# Patient Record
Sex: FEMALE | Race: BLACK OR AFRICAN AMERICAN | HISPANIC OR LATINO | Employment: OTHER | URBAN - METROPOLITAN AREA
[De-identification: names, ages, dates, MRNs, and addresses within clinical notes are randomized per-mention and may not be internally consistent; named-entity substitution may affect disease eponyms.]

---

## 2018-02-05 ENCOUNTER — APPOINTMENT (OUTPATIENT)
Dept: PREADMISSION TESTING | Facility: HOSPITAL | Age: 73
End: 2018-02-05

## 2018-02-05 VITALS — HEIGHT: 64 IN | WEIGHT: 169.75 LBS | BODY MASS INDEX: 28.98 KG/M2

## 2018-02-05 LAB
ANION GAP SERPL CALCULATED.3IONS-SCNC: 7 MMOL/L (ref 3–11)
BUN BLD-MCNC: 17 MG/DL (ref 9–23)
BUN/CREAT SERPL: 18.9 (ref 7–25)
CALCIUM SPEC-SCNC: 10.1 MG/DL (ref 8.7–10.4)
CHLORIDE SERPL-SCNC: 107 MMOL/L (ref 99–109)
CO2 SERPL-SCNC: 28 MMOL/L (ref 20–31)
CREAT BLD-MCNC: 0.9 MG/DL (ref 0.6–1.3)
DEPRECATED RDW RBC AUTO: 44.5 FL (ref 37–54)
ERYTHROCYTE [DISTWIDTH] IN BLOOD BY AUTOMATED COUNT: 13.9 % (ref 11.3–14.5)
GFR SERPL CREATININE-BSD FRML MDRD: 75 ML/MIN/1.73
GLUCOSE BLD-MCNC: 90 MG/DL (ref 70–100)
HCT VFR BLD AUTO: 38.7 % (ref 34.5–44)
HGB BLD-MCNC: 12.5 G/DL (ref 11.5–15.5)
MCH RBC QN AUTO: 28.2 PG (ref 27–31)
MCHC RBC AUTO-ENTMCNC: 32.3 G/DL (ref 32–36)
MCV RBC AUTO: 87.2 FL (ref 80–99)
PLATELET # BLD AUTO: 202 10*3/MM3 (ref 150–450)
PMV BLD AUTO: 10.2 FL (ref 6–12)
POTASSIUM BLD-SCNC: 4.3 MMOL/L (ref 3.5–5.5)
RBC # BLD AUTO: 4.44 10*6/MM3 (ref 3.89–5.14)
SODIUM BLD-SCNC: 142 MMOL/L (ref 132–146)
WBC NRBC COR # BLD: 3.63 10*3/MM3 (ref 3.5–10.8)

## 2018-02-05 PROCEDURE — 93005 ELECTROCARDIOGRAM TRACING: CPT

## 2018-02-05 PROCEDURE — 80048 BASIC METABOLIC PNL TOTAL CA: CPT | Performed by: SURGERY

## 2018-02-05 PROCEDURE — 85027 COMPLETE CBC AUTOMATED: CPT | Performed by: SURGERY

## 2018-02-05 PROCEDURE — 93010 ELECTROCARDIOGRAM REPORT: CPT | Performed by: INTERNAL MEDICINE

## 2018-02-05 PROCEDURE — 36415 COLL VENOUS BLD VENIPUNCTURE: CPT

## 2018-02-05 RX ORDER — AMLODIPINE BESYLATE 10 MG/1
10 TABLET ORAL DAILY
COMMUNITY

## 2018-02-05 RX ORDER — PRAVASTATIN SODIUM 40 MG
40 TABLET ORAL NIGHTLY
COMMUNITY

## 2018-02-05 RX ORDER — ASPIRIN 81 MG/1
81 TABLET ORAL DAILY
COMMUNITY

## 2018-02-05 RX ORDER — METOPROLOL SUCCINATE 50 MG/1
200 TABLET, EXTENDED RELEASE ORAL DAILY
COMMUNITY

## 2018-02-07 ENCOUNTER — ANESTHESIA EVENT (OUTPATIENT)
Dept: PERIOP | Facility: HOSPITAL | Age: 73
End: 2018-02-07

## 2018-02-07 ENCOUNTER — HOSPITAL ENCOUNTER (OUTPATIENT)
Facility: HOSPITAL | Age: 73
Setting detail: HOSPITAL OUTPATIENT SURGERY
Discharge: HOME | End: 2018-02-07
Attending: SURGERY | Admitting: SURGERY

## 2018-02-07 ENCOUNTER — ANESTHESIA (OUTPATIENT)
Dept: PERIOP | Facility: HOSPITAL | Age: 73
End: 2018-02-07

## 2018-02-07 VITALS
DIASTOLIC BLOOD PRESSURE: 73 MMHG | SYSTOLIC BLOOD PRESSURE: 132 MMHG | OXYGEN SATURATION: 94 % | RESPIRATION RATE: 16 BRPM | HEART RATE: 75 BPM | TEMPERATURE: 98.2 F

## 2018-02-07 DIAGNOSIS — E21.3 HYPERPARATHYROIDISM (HCC): ICD-10-CM

## 2018-02-07 PROCEDURE — 88305 TISSUE EXAM BY PATHOLOGIST: CPT | Performed by: SURGERY

## 2018-02-07 PROCEDURE — 25010000002 NEOSTIGMINE 10 MG/10ML SOLUTION: Performed by: NURSE ANESTHETIST, CERTIFIED REGISTERED

## 2018-02-07 PROCEDURE — 88331 PATH CONSLTJ SURG 1 BLK 1SPC: CPT | Performed by: SURGERY

## 2018-02-07 PROCEDURE — 25010000002 PROPOFOL 10 MG/ML EMULSION: Performed by: NURSE ANESTHETIST, CERTIFIED REGISTERED

## 2018-02-07 PROCEDURE — 25010000002 ONDANSETRON PER 1 MG: Performed by: NURSE ANESTHETIST, CERTIFIED REGISTERED

## 2018-02-07 PROCEDURE — 25010000002 DEXAMETHASONE PER 1 MG: Performed by: NURSE ANESTHETIST, CERTIFIED REGISTERED

## 2018-02-07 PROCEDURE — 25010000002 PHENYLEPHRINE PER 1 ML: Performed by: NURSE ANESTHETIST, CERTIFIED REGISTERED

## 2018-02-07 PROCEDURE — 25010000002 FENTANYL CITRATE (PF) 100 MCG/2ML SOLUTION: Performed by: NURSE ANESTHETIST, CERTIFIED REGISTERED

## 2018-02-07 RX ORDER — FAMOTIDINE 10 MG/ML
20 INJECTION, SOLUTION INTRAVENOUS ONCE
Status: DISCONTINUED | OUTPATIENT
Start: 2018-02-07 | End: 2018-02-07

## 2018-02-07 RX ORDER — PROPOFOL 10 MG/ML
VIAL (ML) INTRAVENOUS AS NEEDED
Status: DISCONTINUED | OUTPATIENT
Start: 2018-02-07 | End: 2018-02-07 | Stop reason: SURG

## 2018-02-07 RX ORDER — SODIUM CHLORIDE, SODIUM LACTATE, POTASSIUM CHLORIDE, CALCIUM CHLORIDE 600; 310; 30; 20 MG/100ML; MG/100ML; MG/100ML; MG/100ML
9 INJECTION, SOLUTION INTRAVENOUS CONTINUOUS
Status: DISCONTINUED | OUTPATIENT
Start: 2018-02-07 | End: 2018-02-07 | Stop reason: HOSPADM

## 2018-02-07 RX ORDER — GLYCOPYRROLATE 0.2 MG/ML
INJECTION INTRAMUSCULAR; INTRAVENOUS AS NEEDED
Status: DISCONTINUED | OUTPATIENT
Start: 2018-02-07 | End: 2018-02-07 | Stop reason: SURG

## 2018-02-07 RX ORDER — NEOSTIGMINE METHYLSULFATE 1 MG/ML
INJECTION, SOLUTION INTRAVENOUS AS NEEDED
Status: DISCONTINUED | OUTPATIENT
Start: 2018-02-07 | End: 2018-02-07 | Stop reason: SURG

## 2018-02-07 RX ORDER — DEXAMETHASONE SODIUM PHOSPHATE 4 MG/ML
INJECTION, SOLUTION INTRA-ARTICULAR; INTRALESIONAL; INTRAMUSCULAR; INTRAVENOUS; SOFT TISSUE AS NEEDED
Status: DISCONTINUED | OUTPATIENT
Start: 2018-02-07 | End: 2018-02-07 | Stop reason: SURG

## 2018-02-07 RX ORDER — ONDANSETRON 2 MG/ML
INJECTION INTRAMUSCULAR; INTRAVENOUS AS NEEDED
Status: DISCONTINUED | OUTPATIENT
Start: 2018-02-07 | End: 2018-02-07 | Stop reason: SURG

## 2018-02-07 RX ORDER — HYDROMORPHONE HYDROCHLORIDE 1 MG/ML
0.5 INJECTION, SOLUTION INTRAMUSCULAR; INTRAVENOUS; SUBCUTANEOUS
Status: DISCONTINUED | OUTPATIENT
Start: 2018-02-07 | End: 2018-02-07 | Stop reason: HOSPADM

## 2018-02-07 RX ORDER — LIDOCAINE HYDROCHLORIDE 10 MG/ML
INJECTION, SOLUTION EPIDURAL; INFILTRATION; INTRACAUDAL; PERINEURAL AS NEEDED
Status: DISCONTINUED | OUTPATIENT
Start: 2018-02-07 | End: 2018-02-07 | Stop reason: SURG

## 2018-02-07 RX ORDER — ONDANSETRON 2 MG/ML
4 INJECTION INTRAMUSCULAR; INTRAVENOUS ONCE AS NEEDED
Status: DISCONTINUED | OUTPATIENT
Start: 2018-02-07 | End: 2018-02-07 | Stop reason: HOSPADM

## 2018-02-07 RX ORDER — SODIUM CHLORIDE 0.9 % (FLUSH) 0.9 %
1-10 SYRINGE (ML) INJECTION AS NEEDED
Status: DISCONTINUED | OUTPATIENT
Start: 2018-02-07 | End: 2018-02-07 | Stop reason: HOSPADM

## 2018-02-07 RX ORDER — FENTANYL CITRATE 50 UG/ML
50 INJECTION, SOLUTION INTRAMUSCULAR; INTRAVENOUS
Status: DISCONTINUED | OUTPATIENT
Start: 2018-02-07 | End: 2018-02-07 | Stop reason: HOSPADM

## 2018-02-07 RX ORDER — FAMOTIDINE 20 MG/1
20 TABLET, FILM COATED ORAL ONCE
Status: DISCONTINUED | OUTPATIENT
Start: 2018-02-07 | End: 2018-02-07

## 2018-02-07 RX ORDER — LABETALOL HYDROCHLORIDE 5 MG/ML
5 INJECTION, SOLUTION INTRAVENOUS
Status: DISCONTINUED | OUTPATIENT
Start: 2018-02-07 | End: 2018-02-07 | Stop reason: HOSPADM

## 2018-02-07 RX ORDER — SODIUM CHLORIDE 0.9 % (FLUSH) 0.9 %
1-10 SYRINGE (ML) INJECTION AS NEEDED
Status: DISCONTINUED | OUTPATIENT
Start: 2018-02-07 | End: 2018-02-07

## 2018-02-07 RX ORDER — NAPROXEN SODIUM 220 MG
220 TABLET ORAL 2 TIMES DAILY PRN
COMMUNITY

## 2018-02-07 RX ORDER — LIDOCAINE HYDROCHLORIDE 10 MG/ML
0.5 INJECTION, SOLUTION EPIDURAL; INFILTRATION; INTRACAUDAL; PERINEURAL ONCE AS NEEDED
Status: COMPLETED | OUTPATIENT
Start: 2018-02-07 | End: 2018-02-07

## 2018-02-07 RX ORDER — LIDOCAINE HYDROCHLORIDE 10 MG/ML
0.5 INJECTION, SOLUTION EPIDURAL; INFILTRATION; INTRACAUDAL; PERINEURAL ONCE AS NEEDED
Status: DISCONTINUED | OUTPATIENT
Start: 2018-02-07 | End: 2018-02-07

## 2018-02-07 RX ORDER — FAMOTIDINE 20 MG/1
20 TABLET, FILM COATED ORAL ONCE
Status: COMPLETED | OUTPATIENT
Start: 2018-02-07 | End: 2018-02-07

## 2018-02-07 RX ORDER — FENTANYL CITRATE 50 UG/ML
INJECTION, SOLUTION INTRAMUSCULAR; INTRAVENOUS AS NEEDED
Status: DISCONTINUED | OUTPATIENT
Start: 2018-02-07 | End: 2018-02-07 | Stop reason: SURG

## 2018-02-07 RX ORDER — SODIUM CHLORIDE, SODIUM LACTATE, POTASSIUM CHLORIDE, CALCIUM CHLORIDE 600; 310; 30; 20 MG/100ML; MG/100ML; MG/100ML; MG/100ML
9 INJECTION, SOLUTION INTRAVENOUS CONTINUOUS
Status: DISCONTINUED | OUTPATIENT
Start: 2018-02-07 | End: 2018-02-07

## 2018-02-07 RX ORDER — ATRACURIUM BESYLATE 10 MG/ML
INJECTION, SOLUTION INTRAVENOUS AS NEEDED
Status: DISCONTINUED | OUTPATIENT
Start: 2018-02-07 | End: 2018-02-07 | Stop reason: SURG

## 2018-02-07 RX ADMIN — PROPOFOL 100 MG: 10 INJECTION, EMULSION INTRAVENOUS at 07:52

## 2018-02-07 RX ADMIN — LIDOCAINE HYDROCHLORIDE 0.5 ML: 10 INJECTION, SOLUTION EPIDURAL; INFILTRATION; INTRACAUDAL; PERINEURAL at 06:58

## 2018-02-07 RX ADMIN — FENTANYL CITRATE 100 MCG: 50 INJECTION, SOLUTION INTRAMUSCULAR; INTRAVENOUS at 07:52

## 2018-02-07 RX ADMIN — FENTANYL CITRATE 150 MCG: 50 INJECTION, SOLUTION INTRAMUSCULAR; INTRAVENOUS at 08:50

## 2018-02-07 RX ADMIN — GLYCOPYRROLATE 0.1 MG: 0.2 INJECTION, SOLUTION INTRAMUSCULAR; INTRAVENOUS at 08:43

## 2018-02-07 RX ADMIN — NEOSTIGMINE METHYLSULFATE 2 MG: 1 INJECTION, SOLUTION INTRAVENOUS at 08:43

## 2018-02-07 RX ADMIN — DEXAMETHASONE SODIUM PHOSPHATE 8 MG: 4 INJECTION, SOLUTION INTRAMUSCULAR; INTRAVENOUS at 07:52

## 2018-02-07 RX ADMIN — SODIUM CHLORIDE, POTASSIUM CHLORIDE, SODIUM LACTATE AND CALCIUM CHLORIDE: 600; 310; 30; 20 INJECTION, SOLUTION INTRAVENOUS at 08:25

## 2018-02-07 RX ADMIN — PHENYLEPHRINE HYDROCHLORIDE 100 MCG: 10 INJECTION INTRAVENOUS at 08:02

## 2018-02-07 RX ADMIN — PHENYLEPHRINE HYDROCHLORIDE 100 MCG: 10 INJECTION INTRAVENOUS at 08:00

## 2018-02-07 RX ADMIN — ONDANSETRON 4 MG: 2 INJECTION INTRAMUSCULAR; INTRAVENOUS at 08:25

## 2018-02-07 RX ADMIN — ATRACURIUM BESYLATE 50 MG: 10 INJECTION, SOLUTION INTRAVENOUS at 07:52

## 2018-02-07 RX ADMIN — SODIUM CHLORIDE, POTASSIUM CHLORIDE, SODIUM LACTATE AND CALCIUM CHLORIDE 9 ML/HR: 600; 310; 30; 20 INJECTION, SOLUTION INTRAVENOUS at 06:57

## 2018-02-07 RX ADMIN — PHENYLEPHRINE HYDROCHLORIDE 100 MCG: 10 INJECTION INTRAVENOUS at 07:55

## 2018-02-07 RX ADMIN — FAMOTIDINE 20 MG: 20 TABLET, FILM COATED ORAL at 06:58

## 2018-02-07 RX ADMIN — LIDOCAINE HYDROCHLORIDE 60 MG: 10 INJECTION, SOLUTION EPIDURAL; INFILTRATION; INTRACAUDAL; PERINEURAL at 07:52

## 2018-02-07 RX ADMIN — PHENYLEPHRINE HYDROCHLORIDE 100 MCG: 10 INJECTION INTRAVENOUS at 08:22

## 2018-02-07 NOTE — PLAN OF CARE
Problem: Perioperative Period (Adult)  Goal: Signs and Symptoms of Listed Potential Problems Will be Absent or Manageable (Perioperative Period)  Outcome: Outcome(s) achieved Date Met: 02/07/18

## 2018-02-07 NOTE — ANESTHESIA PREPROCEDURE EVALUATION
Anesthesia Evaluation     Patient summary reviewed and Nursing notes reviewed   NPO Solid Status: > 8 hours  NPO Liquid Status: > 8 hours           Airway   Mallampati: III  TM distance: >3 FB  Neck ROM: full  possible difficult intubation  Dental    (+) implants        Pulmonary    (-) COPD, asthma, shortness of breath, recent URI, not a smoker  Cardiovascular     ECG reviewed    (+) hypertension, hyperlipidemia  (-) past MI, CAD, dysrhythmias, angina, cardiac stents, CABG    ROS comment: EKG Normal SR    Neuro/Psych  (-) seizures, TIA, CVA  GI/Hepatic/Renal/Endo    (-) liver disease, no renal disease, diabetes, hypothyroidism    Musculoskeletal     Abdominal    Substance History      OB/GYN          Other      history of cancer (parathyroid adenoma )        Phys Exam Other: Loose bridge work unremoveable               Anesthesia Plan    ASA 2     general   (Glidescope Intubation care with upper bridge   Prop INF,  Opioid sparing . Anti PONV   )  intravenous induction   Anesthetic plan and risks discussed with patient.    Plan discussed with CRNA.

## 2018-02-07 NOTE — ANESTHESIA POSTPROCEDURE EVALUATION
Patient: Amy Rutherford    Procedure Summary     Date Anesthesia Start Anesthesia Stop Room / Location    02/07/18 0747 0900  [facility]       Procedure Diagnosis Surgeon Provider    EXCISION PARATHYROID ADENOMA (N/A Neck) No diagnosis on file. MD Erickson Wooten MD          Anesthesia Type: general  Last vitals  BP   139/73 (02/07/18 0900)   Temp   98.7 °F (37.1 °C) (02/07/18 0900)   Pulse   83 (02/07/18 0900)   Resp   16 (02/07/18 0900)     SpO2   94 % (02/07/18 0900)     Post Anesthesia Care and Evaluation    Patient location during evaluation: PACU  Patient participation: complete - patient participated  Level of consciousness: awake and alert  Pain score: 0  Pain management: adequate  Airway patency: patent  Anesthetic complications: No anesthetic complications  PONV Status: none  Cardiovascular status: hemodynamically stable and acceptable  Respiratory status: acceptable and nonlabored ventilation  Hydration status: acceptable

## 2018-02-07 NOTE — ANESTHESIA PROCEDURE NOTES
Airway  Urgency: elective    Airway not difficult    General Information and Staff    Patient location during procedure: OR    Indications and Patient Condition  Indications for airway management: airway protection    Preoxygenated: yes  MILS not maintained throughout  Mask difficulty assessment: 1 - vent by mask    Final Airway Details  Final airway type: endotracheal airway      Successful airway: ETT  Cuffed: yes   Successful intubation technique: direct laryngoscopy  Endotracheal tube insertion site: oral  Blade: Britta  Blade size: #3  ETT size: 6.5 mm  Cormack-Lehane Classification: grade I - full view of glottis  Placement verified by: chest auscultation and capnometry   Number of attempts at approach: 1    Additional Comments  Negative epigastric sounds, Breath sound equal bilaterally with symmetric chest rise and fall

## 2018-02-07 NOTE — H&P
Pre-Op H&P  Amy Rutherford  [id number]  1945 February 7, 2018    Chief complaint: primary hyperparathryroidism    HPI:  Patient is a 72 y.o.female presents with bone pain and hypercalcemia     Review of Systems:  General ROS: negative for chills, fever or skin lesions;  No changes since last office visit  Cardiovascular ROS: no chest pain or dyspnea on exertion  Respiratory ROS: no cough, shortness of breath, or wheezing    Allergies: No Known Allergies    Home Meds:    No current facility-administered medications on file prior to encounter.      No current outpatient prescriptions on file prior to encounter.       PMH:   Past Medical History:   Diagnosis Date   • Dental bridge present    • Hyperlipidemia    • Hypertension    • Parathyroid adenoma    • Wears eyeglasses      PSH:    Past Surgical History:   Procedure Laterality Date   • COLONOSCOPY      2 years ago   • MOUTH SURGERY         Immunization History:  Influenza: yes  Pneumococcal: yes  Tetanus: unknown    Social History:   Tobacco:   History   Smoking Status   • Former Smoker   Smokeless Tobacco   • Never Used      Alcohol:     History   Alcohol Use   • Yes     Comment: occasionally       Vitals:           There were no vitals taken for this visit.    Physical Exam:  General Appearance:    Alert, cooperative, no distress, appears stated age   Head:    Normocephalic, without obvious abnormality, atraumatic   Lungs:     Clear to auscultation bilaterally, respirations unlabored    Heart:   Regular rate and rhythm, S1 and S2 normal, no murmur, rub    or gallop    Abdomen:    Soft, non-tender.  +bowel sounds   Breast Exam:    deferred   Genitalia:    deferred   Extremities:   Extremities normal, atraumatic, no cyanosis or edema   Skin:   Skin color, texture, turgor normal, no rashes or lesions   Neurologic:   Grossly intact   Results Review  I reviewed the patient's new clinical results.    Cancer Staging (if applicable)  Cancer Patient: __ yes __no  __unknown; If yes, clinical stage T:__ N:__M:__, stage group or __N/A    Impression: very pleasant 72 year old female who presents with primary parathyroid adenoma    Plan: excision parathyroid adenoma    MANUEL Uriarte   2/7/2018   6:59 AM

## 2018-02-07 NOTE — OP NOTE
PARATHYROIDECTOMY  Procedure Note    Amy Rutherford  Date of Surgery:  2/7/2018    Pre-op Diagnosis:   Primary hyperparathyroidism    Post-op Diagnosis:     Same, secondary to left inferior parathyroid adenoma    Operative Procedure:   Excision of parathyroid adenoma    Indications:  Mrs. Rutherford is a 72-year-old female who presented with increasing hypercalcemia.  Her parathyroid hormone level was also elevated.  A preoperative ultrasound was consistent with a parathyroid adenoma adjacent to the inferior pole of the left thyroid lobe.    Operative note:  On 2/7/18, the patient was taken to the operating room and placed supine on the operating table.  Following adequate induction of general endotracheal anesthesia, a roll was placed underneath the shoulders and the head and neck carefully extended.  The neck and chest were prepped and draped in the standard fashion.  A transverse incision was made in the lower third of the neck.  The subcutaneous tissue was divided with the cautery.  A platysma flap was raised to the level of the thyroid cartilage superiorly and the clavicular heads inferiorly.  The strap muscles were split in the midline.  All dissection was done under 3-1/2 power magnification.  The strap muscles overlying the left thyroid lobe were bluntly dissected revealing a small normal size thyroid lobe.  The middle thyroid vein was divided between clips and small venous tributaries were divided on the substance of the gland with the bipolar cautery.  The superior parathyroid gland was readily identified adjacent to the inferior thyroid artery.  This did not appear to be abnormal.  A small piece of this was sent for biopsy and this was shown to be a normal cellular parathyroid gland.  The gland was further mobilized and the recurrent nerve identified.  Further dissection towards the mediastinum revealed a nodule that was consistent with a parathyroid adenoma.  This actually extended into the thymic had.   This was elevated into the wound and excised in its entirety.  This was sent for frozen section examination that showed this to be a hypercellular parathyroid adenoma.  No other abnormality was noted in the left neck.  It was my decision at this point to not explore the other side.  The wound was irrigated.  Hemostasis was excellent.  The left superior gland was clearly viable.  The recurrent laryngeal nerve was traced from its origin on the neck up to its insertion into the cricothyroid membrane and appeared to be uninjured.  Gelfoam powder was then sprayed into the wound and the strap muscles reapproximated in the midline with running 3-0 Vicryl.  The subcutaneous tissue was reapproximated with running 3-0 Vicryl, and the skin closed with a running 4-0 Vicryl subcuticular suture.  A sterile dressing was applied in the operating room.  The patient tolerated the procedure well and there were no complications.  Estimated blood loss is less than 10 mL's.  Both preoperative and postoperative sponge and needle counts were correct.  The patient was taken to the recovery room in satisfactory condition.    Rick Corrales MD     Date: 2/7/2018  Time: 9:11 AM

## 2018-02-07 NOTE — BRIEF OP NOTE
PARATHYROIDECTOMY  Progress Note    Amy Rutherford  2/7/2018    Pre-op Diagnosis:   Primary hyperparathyrroidism       Post-Op Diagnosis Codes:   same    Procedure/CPT® Codes:      Procedure(s):  EXCISION PARATHYROID ADENOMA    Surgeon(s):  Rick COOPER MD    Anesthesia: General    Staff:   Circulator: Gertrude Sawyer RN  Scrub Person: Debi Fowler  Assistant: Rain Sepulveda PA-C    Estimated Blood Loss: minimal    Urine Voided: * No values recorded between 2/7/2018  7:49 AM and 2/7/2018  8:49 AM *    Specimens:                  ID Type Source Tests Collected by Time Destination   A : BX LEFT SUPERIOR PARATHYROID Tissue Parathyroid Gland TISSUE EXAM Rick COOPER MD 2/7/2018 0823    B : LEFT INFERIOR PARATHYROID Tissue Parathyroid Gland TISSUE EXAM Rick COOPER MD 2/7/2018 0834          Drains:           Findings: normocellular left superior gland;adenomatous left inferior gland    Complications: none      Rick Corrales MD     Date: 2/7/2018  Time: 8:49 AM

## 2018-02-08 LAB
CYTO UR: NORMAL
LAB AP CASE REPORT: NORMAL
LAB AP CLINICAL INFORMATION: NORMAL
Lab: NORMAL
Lab: NORMAL
PATH REPORT.FINAL DX SPEC: NORMAL
PATH REPORT.GROSS SPEC: NORMAL

## 2018-02-20 PROCEDURE — 36415 COLL VENOUS BLD VENIPUNCTURE: CPT | Performed by: SURGERY

## 2018-02-21 ENCOUNTER — LAB REQUISITION (OUTPATIENT)
Dept: LAB | Facility: HOSPITAL | Age: 73
End: 2018-02-21

## 2018-02-21 DIAGNOSIS — Z00.00 ROUTINE GENERAL MEDICAL EXAMINATION AT A HEALTH CARE FACILITY: ICD-10-CM

## 2019-09-16 ENCOUNTER — LAB REQUISITION (OUTPATIENT)
Dept: LAB | Facility: HOSPITAL | Age: 74
End: 2019-09-16

## 2019-09-16 DIAGNOSIS — E21.3 HYPERPARATHYROIDISM (HCC): ICD-10-CM

## 2019-09-16 DIAGNOSIS — E83.51 HYPOCALCEMIA: ICD-10-CM

## 2019-09-16 DIAGNOSIS — I10 ESSENTIAL (PRIMARY) HYPERTENSION: ICD-10-CM

## 2019-09-16 PROCEDURE — 36415 COLL VENOUS BLD VENIPUNCTURE: CPT | Performed by: INTERNAL MEDICINE

## 2019-09-18 ENCOUNTER — LAB REQUISITION (OUTPATIENT)
Dept: LAB | Facility: HOSPITAL | Age: 74
End: 2019-09-18

## 2019-09-18 DIAGNOSIS — Z00.00 ROUTINE GENERAL MEDICAL EXAMINATION AT A HEALTH CARE FACILITY: ICD-10-CM

## 2019-09-18 PROCEDURE — 84156 ASSAY OF PROTEIN URINE: CPT | Performed by: INTERNAL MEDICINE

## 2019-09-18 PROCEDURE — 84166 PROTEIN E-PHORESIS/URINE/CSF: CPT | Performed by: INTERNAL MEDICINE

## 2019-09-23 LAB
ALBUMIN 24H MFR UR ELPH: 57.1 %
ALPHA1 GLOB 24H MFR UR ELPH: 1.2 %
ALPHA2 GLOB 24H MFR UR ELPH: 7 %
B-GLOBULIN MFR UR ELPH: 32.2 %
GAMMA GLOB 24H MFR UR ELPH: 2.4 %
Lab: ABNORMAL
M PROTEIN MFR UR ELPH: ABNORMAL %
PROT 24H UR-MRATE: 235 MG/24 HR (ref 30–150)
PROT UR-MCNC: 8.7 MG/DL

## 2021-02-26 ENCOUNTER — OFFICE VISIT (OUTPATIENT)
Dept: GASTROENTEROLOGY | Facility: CLINIC | Age: 76
End: 2021-02-26

## 2021-02-26 VITALS
DIASTOLIC BLOOD PRESSURE: 88 MMHG | BODY MASS INDEX: 26.29 KG/M2 | SYSTOLIC BLOOD PRESSURE: 140 MMHG | TEMPERATURE: 97.8 F | OXYGEN SATURATION: 99 % | HEIGHT: 65 IN | HEART RATE: 77 BPM | WEIGHT: 157.8 LBS

## 2021-02-26 DIAGNOSIS — R12 HEARTBURN: Primary | ICD-10-CM

## 2021-02-26 DIAGNOSIS — K76.89 LIVER CYST: ICD-10-CM

## 2021-02-26 DIAGNOSIS — R11.0 NAUSEA: ICD-10-CM

## 2021-02-26 PROCEDURE — 99204 OFFICE O/P NEW MOD 45 MIN: CPT | Performed by: INTERNAL MEDICINE

## 2021-02-26 RX ORDER — ROSUVASTATIN CALCIUM 20 MG/1
20 TABLET, COATED ORAL NIGHTLY
COMMUNITY
Start: 2021-01-13

## 2021-02-26 RX ORDER — PROMETHAZINE HYDROCHLORIDE 12.5 MG/1
TABLET ORAL
COMMUNITY
Start: 2021-02-22

## 2021-02-26 RX ORDER — LOSARTAN POTASSIUM 25 MG/1
25 TABLET ORAL DAILY
COMMUNITY
Start: 2021-02-02

## 2021-02-26 NOTE — PROGRESS NOTES
Patient Name: Amy Rutherford  YOB: 1945   Medical Record number: [id number]     PCP: Ange Gomez PA    Chief Complaint   Patient presents with   • Heartburn       History of Present Illness:   HPI  I appreciate the consult for heartburn and nausea.  Mrs. Rutherford presents to the office for evaluation of heartburn and nausea.  The history dates back a couple of years when she was experiencing symptoms.  The patient had gallbladder surgery but did not have resolution of the nausea.  She was evaluated at the [facility] in late 2019.  An MRI was performed at that time.  She is unaware of the exact results.  Mrs. Rutherford states that she did some reading and stopped taking amlodipine which has significantly improved the issue with nausea.  The patient admits that nausea would usually occur in the morning after waking up.  She would generally eat lunch and dinner.  The patient admits to occasionally consuming snacks late in the evening.  There is no history of vomiting.  Mrs. Rutherford denies any difficult or painful swallowing.  She had an upper endoscopy performed by Dr. He last year.  Mrs. Rutherford believes that the Protonix medication was causing her to have some abdominal pain so she stopped taking the medication.  However heartburn has returned a couple days a week.  Mrs. Rutherford denies any abdominal pain.  There is no current issue with weight loss.  She denies any change in bowel habits or signs of bleeding.  There is no family history of gastrointestinal cancer.  Her son does have a history of chronic pancreatitis.  The patient not smoke cigarettes or drink alcohol.  Past Medical History:   Diagnosis Date   • Dental bridge present    • Hyperlipidemia    • Hypertension    • Parathyroid adenoma    • Wears eyeglasses        Past Surgical History:   Procedure Laterality Date   • COLONOSCOPY      2 years ago   • MOUTH SURGERY     • IA EXPLORE PARATHYROID GLANDS N/A 2/7/2018     Procedure: EXCISION PARATHYROID ADENOMA;  Surgeon: Rick COOPER MD;  [facility]: [facility];  Service: General   • TONSILLECTOMY           Current Outpatient Medications:   •  aspirin 81 MG EC tablet, Take 81 mg by mouth Daily., Disp: , Rfl:   •  losartan (COZAAR) 25 MG tablet, Take 25 mg by mouth Daily., Disp: , Rfl:   •  metoprolol succinate XL (TOPROL-XL) 50 MG 24 hr tablet, Take 100 mg by mouth 2 (two) times a day., Disp: , Rfl:   •  naproxen sodium (ALEVE) 220 MG tablet, Take 220 mg by mouth 2 (Two) Times a Day As Needed for Mild Pain ., Disp: , Rfl:   •  promethazine (PHENERGAN) 12.5 MG tablet, TAKE 1 TABLET BY MOUTH TWICE DAILY AS NEEDED FOR NAUSEA, Disp: , Rfl:   •  rosuvastatin (CRESTOR) 20 MG tablet, Take 20 mg by mouth Every Night., Disp: , Rfl:   •  amLODIPine (NORVASC) 10 MG tablet, Take 10 mg by mouth Daily., Disp: , Rfl:   •  oxyCODONE-acetaminophen (PERCOCET) 5-325 MG per tablet, Take 1-2 tablets by mouth Every 6 (Six) Hours As Needed for pain., Disp: 20 tablet, Rfl: 0  •  pravastatin (PRAVACHOL) 40 MG tablet, Take 40 mg by mouth Every Night., Disp: , Rfl:     Allergies   Allergen Reactions   • Reglan [Metoclopramide] Anxiety       Family History   Family history unknown: Yes       Social History     Socioeconomic History   • Marital status:      Spouse name: Not on file   • Number of children: Not on file   • Years of education: Not on file   • Highest education level: Not on file   Tobacco Use   • Smoking status: Former Smoker   • Smokeless tobacco: Never Used   Substance and Sexual Activity   • Alcohol use: Yes     Comment: occasionally   • Drug use: No   • Sexual activity: Yes     Partners: Male     Comment:         Review of Systems   Constitutional: Negative for activity change, appetite change, fatigue, fever and unexpected weight change.   HENT: Negative for dental problem, hearing loss, mouth sores, postnasal drip, sneezing, trouble swallowing and voice change.    Eyes: Negative  for pain, redness, itching and visual disturbance.   Respiratory: Negative for cough, choking, chest tightness, shortness of breath and wheezing.    Cardiovascular: Negative for chest pain, palpitations and leg swelling.   Gastrointestinal: Positive for nausea. Negative for abdominal distention, abdominal pain, anal bleeding, blood in stool, constipation, diarrhea, rectal pain and vomiting.        Heartburn   Endocrine: Negative for cold intolerance, heat intolerance, polydipsia, polyphagia and polyuria.   Genitourinary: Negative.  Negative for dysuria, enuresis, flank pain, hematuria and urgency.   Musculoskeletal: Negative for arthralgias, back pain, gait problem, joint swelling and myalgias.   Skin: Negative for color change, pallor and rash.   Allergic/Immunologic: Negative for environmental allergies, food allergies and immunocompromised state.   Neurological: Negative for dizziness, tremors, seizures, facial asymmetry, speech difficulty, numbness and headaches.   Hematological: Negative for adenopathy.   Psychiatric/Behavioral: Negative for behavioral problems, confusion, dysphoric mood, hallucinations and self-injury.       Vitals:    02/26/21 0812   BP: 140/88   Pulse: 77   Temp: 97.8 °F (36.6 °C)   SpO2: 99%       Physical Exam  Vitals signs reviewed.   Constitutional:       Appearance: Normal appearance.   HENT:      Head: Normocephalic and atraumatic.      Nose: Nose normal.      Mouth/Throat:      Mouth: Mucous membranes are moist.      Pharynx: Oropharynx is clear.   Eyes:      General: No scleral icterus.     Extraocular Movements: Extraocular movements intact.   Neck:      Musculoskeletal: Normal range of motion. No neck rigidity.   Cardiovascular:      Rate and Rhythm: Normal rate and regular rhythm.      Heart sounds: No murmur. No gallop.    Pulmonary:      Effort: Pulmonary effort is normal.      Breath sounds: No wheezing or rales.   Abdominal:      General: Abdomen is flat. Bowel sounds are  normal.      Palpations: Abdomen is soft.      Tenderness: There is no abdominal tenderness. There is no guarding.   Musculoskeletal: Normal range of motion.         General: No swelling.   Skin:     General: Skin is warm and dry.      Coloration: Skin is not jaundiced.   Neurological:      General: No focal deficit present.      Mental Status: She is alert and oriented to person, place, and time.   Psychiatric:         Mood and Affect: Mood normal.         Thought Content: Thought content normal.         Judgment: Judgment normal.         Diagnoses and all orders for this visit:    1. Heartburn (Primary)  -     NM Gastric Emptying; Future    2. Nausea  -     NM Gastric Emptying; Future    3. Liver cyst  -     MRI abdomen w wo contrast mrcp; Future    The patient does admit to some recent heartburn.  This has been more of an issue since stopping the Protonix which she thought was causing  abdominal pain.  She will likely need to be on medication for GERD.  The history of nausea would suggest the possibility of gastroparesis.  The patient believes a gastric empty study was done in the past but there were no records available.  I reviewed MRI performed at [facility] in 2019.  There is a question of a cyst in the liver about 5 cm.  There were also smaller tiny lesions considered to be cysts present.  The most recent liver panel tests were within normal limits.  The finding on the previous MRI should be followed up given the current complaints.      Plan: Will schedule 4 hour gastric emptying study.           Will schedule MRI of the abdomen to assess and follow-up presumed liver cyst.           Will give samples of Dexilant 60 mg capsule 1 by mouth daily.           Will give the patient information regarding GERD and lifestyle modification.           Will follow-up in 3 months.

## 2021-02-26 NOTE — PATIENT INSTRUCTIONS
Gastroesophageal Reflux Disease, Adult  Gastroesophageal reflux (RASHIDA) happens when acid from the stomach flows up into the tube that connects the mouth and the stomach (esophagus). Normally, food travels down the esophagus and stays in the stomach to be digested. With RASHIDA, food and stomach acid sometimes move back up into the esophagus. You may have a disease called gastroesophageal reflux disease (GERD) if the reflux:  · Happens often.  · Causes frequent or very bad symptoms.  · Causes problems such as damage to the esophagus.  When this happens, the esophagus becomes sore and swollen (inflamed). Over time, GERD can make small holes (ulcers) in the lining of the esophagus.  What are the causes?  This condition is caused by a problem with the muscle between the esophagus and the stomach. When this muscle is weak or not normal, it does not close properly to keep food and acid from coming back up from the stomach. The muscle can be weak because of:  · Tobacco use.  · Pregnancy.  · Having a certain type of hernia (hiatal hernia).  · Alcohol use.  · Certain foods and drinks, such as coffee, chocolate, onions, and peppermint.  What increases the risk?  You are more likely to develop this condition if you:  · Are overweight.  · Have a disease that affects your connective tissue.  · Use NSAID medicines.  What are the signs or symptoms?  Symptoms of this condition include:  · Heartburn.  · Difficult or painful swallowing.  · The feeling of having a lump in the throat.  · A bitter taste in the mouth.  · Bad breath.  · Having a lot of saliva.  · Having an upset or bloated stomach.  · Belching.  · Chest pain. Different conditions can cause chest pain. Make sure you see your doctor if you have chest pain.  · Shortness of breath or noisy breathing (wheezing).  · Ongoing (chronic) cough or a cough at night.  · Wearing away of the surface of teeth (tooth enamel).  · Weight loss.  How is this treated?  Treatment will depend on how  bad your symptoms are. Your doctor may suggest:  · Changes to your diet.  · Medicine.  · Surgery.  Follow these instructions at home:  Eating and drinking    · Follow a diet as told by your doctor. You may need to avoid foods and drinks such as:  ? Coffee and tea (with or without caffeine).  ? Drinks that contain alcohol.  ? Energy drinks and sports drinks.  ? Bubbly (carbonated) drinks or sodas.  ? Chocolate and cocoa.  ? Peppermint and mint flavorings.  ? Garlic and onions.  ? Horseradish.  ? Spicy and acidic foods. These include peppers, chili powder, hennessy powder, vinegar, hot sauces, and BBQ sauce.  ? Citrus fruit juices and citrus fruits, such as oranges, elle, and limes.  ? Tomato-based foods. These include red sauce, chili, salsa, and pizza with red sauce.  ? Fried and fatty foods. These include donuts, french fries, potato chips, and high-fat dressings.  ? High-fat meats. These include hot dogs, rib eye steak, sausage, ham, and lloyd.  ? High-fat dairy items, such as whole milk, butter, and cream cheese.  · Eat small meals often. Avoid eating large meals.  · Avoid drinking large amounts of liquid with your meals.  · Avoid eating meals during the 2-3 hours before bedtime.  · Avoid lying down right after you eat.  · Do not exercise right after you eat.  Lifestyle    · Do not use any products that contain nicotine or tobacco. These include cigarettes, e-cigarettes, and chewing tobacco. If you need help quitting, ask your doctor.  · Try to lower your stress. If you need help doing this, ask your doctor.  · If you are overweight, lose an amount of weight that is healthy for you. Ask your doctor about a safe weight loss goal.  General instructions  · Pay attention to any changes in your symptoms.  · Take over-the-counter and prescription medicines only as told by your doctor. Do not take aspirin, ibuprofen, or other NSAIDs unless your doctor says it is okay.  · Wear loose clothes. Do not wear anything tight  around your waist.  · Raise (elevate) the head of your bed about 6 inches (15 cm).  · Avoid bending over if this makes your symptoms worse.  · Keep all follow-up visits as told by your doctor. This is important.  Contact a doctor if:  · You have new symptoms.  · You lose weight and you do not know why.  · You have trouble swallowing or it hurts to swallow.  · You have wheezing or a cough that keeps happening.  · Your symptoms do not get better with treatment.  · You have a hoarse voice.  Get help right away if:  · You have pain in your arms, neck, jaw, teeth, or back.  · You feel sweaty, dizzy, or light-headed.  · You have chest pain or shortness of breath.  · You throw up (vomit) and your throw-up looks like blood or coffee grounds.  · You pass out (faint).  · Your poop (stool) is bloody or black.  · You cannot swallow, drink, or eat.  Summary  · If a person has gastroesophageal reflux disease (GERD), food and stomach acid move back up into the esophagus and cause symptoms or problems such as damage to the esophagus.  · Treatment will depend on how bad your symptoms are.  · Follow a diet as told by your doctor.  · Take all medicines only as told by your doctor.  This information is not intended to replace advice given to you by your health care provider. Make sure you discuss any questions you have with your health care provider.  Document Revised: 06/26/2019 Document Reviewed: 06/26/2019  [employer] Patient Education © 2020 [employer]

## 2021-04-05 ENCOUNTER — APPOINTMENT (OUTPATIENT)
Dept: MRI IMAGING | Facility: HOSPITAL | Age: 76
End: 2021-04-05

## 2021-04-07 ENCOUNTER — APPOINTMENT (OUTPATIENT)
Dept: NUCLEAR MEDICINE | Facility: HOSPITAL | Age: 76
End: 2021-04-07

## 2021-04-26 ENCOUNTER — APPOINTMENT (OUTPATIENT)
Dept: MRI IMAGING | Facility: HOSPITAL | Age: 76
End: 2021-04-26

## 2021-05-03 ENCOUNTER — APPOINTMENT (OUTPATIENT)
Dept: NUCLEAR MEDICINE | Facility: HOSPITAL | Age: 76
End: 2021-05-03

## 2021-05-24 ENCOUNTER — APPOINTMENT (OUTPATIENT)
Dept: NUCLEAR MEDICINE | Facility: HOSPITAL | Age: 76
End: 2021-05-24

## 2021-05-24 ENCOUNTER — APPOINTMENT (OUTPATIENT)
Dept: MRI IMAGING | Facility: HOSPITAL | Age: 76
End: 2021-05-24

## 2021-06-09 ENCOUNTER — HOSPITAL ENCOUNTER (OUTPATIENT)
Dept: MRI IMAGING | Facility: HOSPITAL | Age: 76
Discharge: HOME | End: 2021-06-09
Admitting: INTERNAL MEDICINE

## 2021-06-09 DIAGNOSIS — K76.89 LIVER CYST: ICD-10-CM

## 2021-06-09 LAB — CREAT BLDA-MCNC: 0.9 MG/DL (ref 0.6–1.3)

## 2021-06-09 PROCEDURE — 74183 MRI ABD W/O CNTR FLWD CNTR: CPT

## 2021-06-09 PROCEDURE — A9577 INJ MULTIHANCE: HCPCS | Performed by: INTERNAL MEDICINE

## 2021-06-09 PROCEDURE — 82565 ASSAY OF CREATININE: CPT

## 2021-06-09 PROCEDURE — 0 GADOBENATE DIMEGLUMINE 529 MG/ML SOLUTION: Performed by: INTERNAL MEDICINE

## 2021-06-09 RX ADMIN — GADOBENATE DIMEGLUMINE 14 ML: 529 INJECTION, SOLUTION INTRAVENOUS at 15:30

## 2021-06-11 ENCOUNTER — TELEPHONE (OUTPATIENT)
Dept: GASTROENTEROLOGY | Facility: CLINIC | Age: 76
End: 2021-06-11

## 2021-06-11 NOTE — TELEPHONE ENCOUNTER
I spoke to Mrs. Rutherford on the phone.  The MRI demonstrates a liver cyst.  The patient is going to reschedule her next office visit after the 4-hour gastric emptying study.

## 2021-07-06 ENCOUNTER — HOSPITAL ENCOUNTER (OUTPATIENT)
Dept: NUCLEAR MEDICINE | Facility: HOSPITAL | Age: 76
Discharge: HOME | End: 2021-07-06

## 2021-07-06 DIAGNOSIS — R11.0 NAUSEA: ICD-10-CM

## 2021-07-06 DIAGNOSIS — R12 HEARTBURN: ICD-10-CM

## 2021-07-06 PROCEDURE — 0 TECHNETIUM SULFUR COLLOID: Performed by: INTERNAL MEDICINE

## 2021-07-06 PROCEDURE — A9541 TC99M SULFUR COLLOID: HCPCS | Performed by: INTERNAL MEDICINE

## 2021-07-06 PROCEDURE — 78264 GASTRIC EMPTYING IMG STUDY: CPT

## 2021-07-06 RX ADMIN — TECHNETIUM TC 99M SULFUR COLLOID 1 DOSE: KIT at 08:22

## 2021-07-22 ENCOUNTER — OFFICE VISIT (OUTPATIENT)
Dept: GASTROENTEROLOGY | Facility: CLINIC | Age: 76
End: 2021-07-22

## 2021-07-22 VITALS
WEIGHT: 156 LBS | SYSTOLIC BLOOD PRESSURE: 160 MMHG | HEIGHT: 65 IN | DIASTOLIC BLOOD PRESSURE: 80 MMHG | HEART RATE: 72 BPM | BODY MASS INDEX: 25.99 KG/M2 | OXYGEN SATURATION: 100 % | TEMPERATURE: 97.5 F

## 2021-07-22 DIAGNOSIS — R51.9 NONINTRACTABLE HEADACHE, UNSPECIFIED CHRONICITY PATTERN, UNSPECIFIED HEADACHE TYPE: ICD-10-CM

## 2021-07-22 DIAGNOSIS — K76.89 HEPATIC CYST: ICD-10-CM

## 2021-07-22 DIAGNOSIS — R11.0 NAUSEA: Primary | ICD-10-CM

## 2021-07-22 PROCEDURE — 99214 OFFICE O/P EST MOD 30 MIN: CPT | Performed by: INTERNAL MEDICINE

## 2021-07-22 RX ORDER — BENZONATATE 100 MG/1
CAPSULE ORAL
COMMUNITY
Start: 2021-07-03

## 2021-07-22 RX ORDER — CYANOCOBALAMIN 1000 UG/ML
INJECTION, SOLUTION INTRAMUSCULAR; SUBCUTANEOUS
COMMUNITY
Start: 2021-06-22

## 2021-07-22 RX ORDER — FLUTICASONE PROPIONATE 50 MCG
SPRAY, SUSPENSION (ML) NASAL
COMMUNITY
Start: 2021-07-03

## 2021-07-22 RX ORDER — CHLORTHALIDONE 25 MG/1
25 TABLET ORAL EVERY MORNING
COMMUNITY
Start: 2021-06-17

## 2021-07-22 RX ORDER — ERGOCALCIFEROL 1.25 MG/1
50000 CAPSULE ORAL WEEKLY
COMMUNITY
Start: 2021-06-22

## 2021-07-22 RX ORDER — PROMETHAZINE HYDROCHLORIDE 25 MG/1
25 TABLET ORAL EVERY 8 HOURS PRN
COMMUNITY
Start: 2021-06-22

## 2021-07-22 NOTE — PROGRESS NOTES
Patient Name: Amy Rutherford   YOB: 1945   Medical Record number: [id number]     PCP: Ange Gomez PA    Chief Complaint   Patient presents with   • Follow-up     3 month   Nausea.    History of Present Illness:   HPI  Mrs. Rutherford returns to the office for a follow-up visit.  The patient states that her nausea is worse.  She denies any vomiting of undigested food or vomiting in general.  The patient states that the nausea has been an issue for several years.  She does complain today of some numbness and tingling in her upper legs that seems to radiate upward.  The patient also has experienced some headaches recently.  There is no history of weight loss.  Her appetite overall is good.  She had no improvement with the use of Dexilant.  The patient takes Pepcid for heartburn and this does seem to give her the most relief.  She denies any bowel changes or blood in the stool.  There is no history of night sweats, fever or chills the patient denies any change in color eyes or skin.  Mrs. Rutherford denies any bloating.  There is no history of vertigo.  The patient states that the current medications are not new.  The phenergan does cause her to have some drowsiness.  She is unaware of any personal history of sleep apnea.  Past Medical History:   Diagnosis Date   • Dental bridge present    • GERD (gastroesophageal reflux disease)    • Hyperlipidemia    • Hypertension    • Parathyroid adenoma    • Wears eyeglasses        Past Surgical History:   Procedure Laterality Date   • COLONOSCOPY      2 years ago   • MOUTH SURGERY     • MS EXPLORE PARATHYROID GLANDS N/A 2/7/2018    Procedure: EXCISION PARATHYROID ADENOMA;  Surgeon: Rick COOPER MD;  Location: On license of [facility];  Service: General   • TONSILLECTOMY     Cholecystectomy      Current Outpatient Medications:   •  aspirin 81 MG EC tablet, Take 81 mg by mouth Daily., Disp: , Rfl:   •  chlorthalidone (HYGROTON) 25 MG tablet, Take 25 mg by mouth Every  Morning., Disp: , Rfl:   •  cyanocobalamin 1000 MCG/ML injection, INJECT 1 MILLILITER ONCE MONTHLY, Disp: , Rfl:   •  fluticasone (FLONASE) 50 MCG/ACT nasal spray, SHAKE LIQUID AND USE 1 SPRAY IN EACH NOSTRIL EVERY DAY, Disp: , Rfl:   •  metoprolol succinate XL (TOPROL-XL) 50 MG 24 hr tablet, Take 200 mg by mouth Daily., Disp: , Rfl:   •  promethazine (PHENERGAN) 25 MG tablet, Take 25 mg by mouth Every 8 (Eight) Hours As Needed. for nausea, Disp: , Rfl:   •  rosuvastatin (CRESTOR) 20 MG tablet, Take 20 mg by mouth Every Night., Disp: , Rfl:   •  vitamin D (ERGOCALCIFEROL) 1.25 MG (72286 UT) capsule capsule, Take 50,000 Units by mouth 1 (One) Time Per Week., Disp: , Rfl:   •  amLODIPine (NORVASC) 10 MG tablet, Take 10 mg by mouth Daily., Disp: , Rfl:   •  benzonatate (TESSALON) 100 MG capsule, TAKE 1 CAPSULE BY MOUTH THREE TIMES DAILY FOR 7 DAYS, Disp: , Rfl:   •  losartan (COZAAR) 25 MG tablet, Take 25 mg by mouth Daily., Disp: , Rfl:   •  naproxen sodium (ALEVE) 220 MG tablet, Take 220 mg by mouth 2 (Two) Times a Day As Needed for Mild Pain ., Disp: , Rfl:   •  oxyCODONE-acetaminophen (PERCOCET) 5-325 MG per tablet, Take 1-2 tablets by mouth Every 6 (Six) Hours As Needed for pain., Disp: 20 tablet, Rfl: 0  •  pravastatin (PRAVACHOL) 40 MG tablet, Take 40 mg by mouth Every Night., Disp: , Rfl:   •  promethazine (PHENERGAN) 12.5 MG tablet, TAKE 1 TABLET BY MOUTH TWICE DAILY AS NEEDED FOR NAUSEA, Disp: , Rfl:     Allergies   Allergen Reactions   • Reglan [Metoclopramide] Anxiety       Family History   Family history unknown: Yes       Social History     Socioeconomic History   • Marital status:      Spouse name: Not on file   • Number of children: Not on file   • Years of education: Not on file   • Highest education level: Not on file   Tobacco Use   • Smoking status: Former Smoker   • Smokeless tobacco: Never Used   Substance and Sexual Activity   • Alcohol use: Yes     Comment: occasionally   • Drug use: No    • Sexual activity: Yes     Partners: Male     Comment:         Review of Systems   Constitutional: Negative for activity change, appetite change, fatigue, fever and unexpected weight change.   HENT: Negative for dental problem, hearing loss, mouth sores, postnasal drip, sneezing, trouble swallowing and voice change.    Eyes: Negative for pain, redness, itching and visual disturbance.   Respiratory: Negative for cough, choking, chest tightness, shortness of breath and wheezing.    Cardiovascular: Negative for chest pain, palpitations and leg swelling.   Gastrointestinal: Positive for nausea. Negative for abdominal distention, abdominal pain, anal bleeding, blood in stool, constipation, diarrhea, rectal pain and vomiting.        Heartburn   Endocrine: Negative for cold intolerance, heat intolerance, polydipsia, polyphagia and polyuria.   Genitourinary: Negative.  Negative for dysuria, enuresis, flank pain, hematuria and urgency.   Musculoskeletal: Negative for arthralgias, back pain, gait problem, joint swelling and myalgias.   Skin: Negative for color change, pallor and rash.   Allergic/Immunologic: Negative for environmental allergies, food allergies and immunocompromised state.   Neurological: Negative for dizziness, tremors, seizures, facial asymmetry, speech difficulty, numbness and headaches.   Hematological: Negative for adenopathy.   Psychiatric/Behavioral: Negative for behavioral problems, confusion, dysphoric mood, hallucinations and self-injury.       Vitals:    07/22/21 1419   BP: 160/80   Pulse: 72   Temp: 97.5 °F (36.4 °C)   SpO2: 100%       Physical Exam  Vitals reviewed.   Constitutional:       General: She is not in acute distress.     Appearance: Normal appearance.   HENT:      Head: Normocephalic and atraumatic.      Nose: Nose normal.      Mouth/Throat:      Mouth: Mucous membranes are moist.   Eyes:      General: No scleral icterus.     Extraocular Movements: Extraocular movements intact.    Cardiovascular:      Rate and Rhythm: Normal rate and regular rhythm.      Heart sounds: No murmur heard.   No gallop.    Pulmonary:      Breath sounds: Normal breath sounds. No wheezing or rales.   Abdominal:      General: Bowel sounds are normal.      Palpations: Abdomen is soft.      Tenderness: There is no abdominal tenderness. There is no guarding.   Musculoskeletal:         General: No swelling. Normal range of motion.      Cervical back: Normal range of motion. No rigidity.   Skin:     Coloration: Skin is not jaundiced.   Neurological:      Mental Status: She is alert and oriented to person, place, and time.   Psychiatric:         Mood and Affect: Mood normal.         Thought Content: Thought content normal.         Judgment: Judgment normal.         Diagnoses and all orders for this visit:    1. Nausea (Primary)  -     Celiac Ab tTG DGP TIgA; Future    2. Hepatic cyst    3. Nonintractable headache, unspecified chronicity pattern, unspecified headache type    The patient complains of nausea no gastrointestinal etiology been found today.  I did review the upper endoscopy from last year and there was no evidence for H. pylori on the gastric biopsies.  Will do a blood test to check for celiac disease.  The hepatic cysts are unlikely to be the source of nausea.  She does not have any vomiting and had a normal 4-hour gastric emptying study.  The patient is status post cholecystectomy.  She had a normal-appearing pancreas on MRI study.  The differential diagnosis for nausea at this time should include etiologies outside the gastroenterology realm.  The patient does complain of some headaches and a numbness tingling in the proximal aspect of her legs.      Plan: Will check celiac panel.           Suggest neurological work-up for nausea to include imaging of the head.

## 2021-07-25 ENCOUNTER — APPOINTMENT (OUTPATIENT)
Dept: GENERAL RADIOLOGY | Facility: HOSPITAL | Age: 76
End: 2021-07-25

## 2021-07-25 ENCOUNTER — HOSPITAL ENCOUNTER (EMERGENCY)
Facility: HOSPITAL | Age: 76
Discharge: HOME | End: 2021-07-25
Attending: EMERGENCY MEDICINE | Admitting: EMERGENCY MEDICINE

## 2021-07-25 ENCOUNTER — APPOINTMENT (OUTPATIENT)
Dept: CT IMAGING | Facility: HOSPITAL | Age: 76
End: 2021-07-25

## 2021-07-25 VITALS
WEIGHT: 155 LBS | RESPIRATION RATE: 16 BRPM | SYSTOLIC BLOOD PRESSURE: 169 MMHG | HEART RATE: 90 BPM | BODY MASS INDEX: 25.83 KG/M2 | OXYGEN SATURATION: 100 % | TEMPERATURE: 97.1 F | HEIGHT: 65 IN | DIASTOLIC BLOOD PRESSURE: 96 MMHG

## 2021-07-25 DIAGNOSIS — R20.2 PARESTHESIA OF UPPER AND LOWER EXTREMITIES OF BOTH SIDES: ICD-10-CM

## 2021-07-25 DIAGNOSIS — I10 HYPERTENSION, UNSPECIFIED TYPE: Primary | ICD-10-CM

## 2021-07-25 LAB
ALBUMIN SERPL-MCNC: 4.1 G/DL (ref 3.5–5.2)
ALBUMIN/GLOB SERPL: 1.4 G/DL
ALP SERPL-CCNC: 84 U/L (ref 39–117)
ALT SERPL W P-5'-P-CCNC: 12 U/L (ref 1–33)
ANION GAP SERPL CALCULATED.3IONS-SCNC: 12 MMOL/L (ref 5–15)
AST SERPL-CCNC: 17 U/L (ref 1–32)
BACTERIA UR QL AUTO: NORMAL /HPF
BASOPHILS # BLD AUTO: 0.05 10*3/MM3 (ref 0–0.2)
BASOPHILS NFR BLD AUTO: 1.3 % (ref 0–1.5)
BILIRUB SERPL-MCNC: 0.4 MG/DL (ref 0–1.2)
BILIRUB UR QL STRIP: NEGATIVE
BUN SERPL-MCNC: 10 MG/DL (ref 8–23)
BUN/CREAT SERPL: 11 (ref 7–25)
CALCIUM SPEC-SCNC: 9.5 MG/DL (ref 8.6–10.5)
CHLORIDE SERPL-SCNC: 100 MMOL/L (ref 98–107)
CLARITY UR: CLEAR
CO2 SERPL-SCNC: 26 MMOL/L (ref 22–29)
COLOR UR: YELLOW
CREAT SERPL-MCNC: 0.91 MG/DL (ref 0.57–1)
DEPRECATED RDW RBC AUTO: 43.4 FL (ref 37–54)
EOSINOPHIL # BLD AUTO: 0.09 10*3/MM3 (ref 0–0.4)
EOSINOPHIL NFR BLD AUTO: 2.3 % (ref 0.3–6.2)
ERYTHROCYTE [DISTWIDTH] IN BLOOD BY AUTOMATED COUNT: 13.8 % (ref 12.3–15.4)
GFR SERPL CREATININE-BSD FRML MDRD: 73 ML/MIN/1.73
GLOBULIN UR ELPH-MCNC: 2.9 GM/DL
GLUCOSE SERPL-MCNC: 110 MG/DL (ref 65–99)
GLUCOSE UR STRIP-MCNC: NEGATIVE MG/DL
HCT VFR BLD AUTO: 44.5 % (ref 34–46.6)
HGB BLD-MCNC: 14.6 G/DL (ref 12–15.9)
HGB UR QL STRIP.AUTO: NEGATIVE
HOLD SPECIMEN: NORMAL
HYALINE CASTS UR QL AUTO: NORMAL /LPF
IMM GRANULOCYTES # BLD AUTO: 0 10*3/MM3 (ref 0–0.05)
IMM GRANULOCYTES NFR BLD AUTO: 0 % (ref 0–0.5)
KETONES UR QL STRIP: NEGATIVE
LEUKOCYTE ESTERASE UR QL STRIP.AUTO: ABNORMAL
LYMPHOCYTES # BLD AUTO: 1.32 10*3/MM3 (ref 0.7–3.1)
LYMPHOCYTES NFR BLD AUTO: 33.6 % (ref 19.6–45.3)
MAGNESIUM SERPL-MCNC: 1.9 MG/DL (ref 1.6–2.4)
MCH RBC QN AUTO: 28.4 PG (ref 26.6–33)
MCHC RBC AUTO-ENTMCNC: 32.8 G/DL (ref 31.5–35.7)
MCV RBC AUTO: 86.6 FL (ref 79–97)
MONOCYTES # BLD AUTO: 0.33 10*3/MM3 (ref 0.1–0.9)
MONOCYTES NFR BLD AUTO: 8.4 % (ref 5–12)
NEUTROPHILS NFR BLD AUTO: 2.14 10*3/MM3 (ref 1.7–7)
NEUTROPHILS NFR BLD AUTO: 54.4 % (ref 42.7–76)
NITRITE UR QL STRIP: NEGATIVE
NRBC BLD AUTO-RTO: 0 /100 WBC (ref 0–0.2)
PH UR STRIP.AUTO: 7.5 [PH] (ref 5–8)
PLATELET # BLD AUTO: 273 10*3/MM3 (ref 140–450)
PMV BLD AUTO: 9.7 FL (ref 6–12)
POTASSIUM SERPL-SCNC: 4.1 MMOL/L (ref 3.5–5.2)
PROT SERPL-MCNC: 7 G/DL (ref 6–8.5)
PROT UR QL STRIP: NEGATIVE
RBC # BLD AUTO: 5.14 10*6/MM3 (ref 3.77–5.28)
RBC # UR: NORMAL /HPF
REF LAB TEST METHOD: NORMAL
SODIUM SERPL-SCNC: 138 MMOL/L (ref 136–145)
SP GR UR STRIP: 1.01 (ref 1–1.03)
SQUAMOUS #/AREA URNS HPF: NORMAL /HPF
TROPONIN T SERPL-MCNC: <0.01 NG/ML (ref 0–0.03)
UROBILINOGEN UR QL STRIP: ABNORMAL
WBC # BLD AUTO: 3.93 10*3/MM3 (ref 3.4–10.8)
WBC UR QL AUTO: NORMAL /HPF
WHOLE BLOOD HOLD SPECIMEN: NORMAL

## 2021-07-25 PROCEDURE — 84484 ASSAY OF TROPONIN QUANT: CPT | Performed by: EMERGENCY MEDICINE

## 2021-07-25 PROCEDURE — 93005 ELECTROCARDIOGRAM TRACING: CPT | Performed by: EMERGENCY MEDICINE

## 2021-07-25 PROCEDURE — 83735 ASSAY OF MAGNESIUM: CPT | Performed by: EMERGENCY MEDICINE

## 2021-07-25 PROCEDURE — 93005 ELECTROCARDIOGRAM TRACING: CPT

## 2021-07-25 PROCEDURE — 71045 X-RAY EXAM CHEST 1 VIEW: CPT

## 2021-07-25 PROCEDURE — 80053 COMPREHEN METABOLIC PANEL: CPT | Performed by: EMERGENCY MEDICINE

## 2021-07-25 PROCEDURE — 70450 CT HEAD/BRAIN W/O DYE: CPT

## 2021-07-25 PROCEDURE — 85025 COMPLETE CBC W/AUTO DIFF WBC: CPT | Performed by: EMERGENCY MEDICINE

## 2021-07-25 PROCEDURE — 99283 EMERGENCY DEPT VISIT LOW MDM: CPT

## 2021-07-25 PROCEDURE — 81001 URINALYSIS AUTO W/SCOPE: CPT | Performed by: EMERGENCY MEDICINE

## 2021-07-25 RX ORDER — SODIUM CHLORIDE 0.9 % (FLUSH) 0.9 %
10 SYRINGE (ML) INJECTION AS NEEDED
Status: DISCONTINUED | OUTPATIENT
Start: 2021-07-25 | End: 2021-07-25 | Stop reason: HOSPADM

## 2021-07-25 NOTE — DISCHARGE INSTRUCTIONS
Metoprolol XL cannot be broken in half due to the timed release nature of the drug. Take as directed.

## 2021-07-25 NOTE — ED PROVIDER NOTES
Subjective   Patient is a pleasant 76-year-old female who presents to the ER today with complaints of 2 days of mild upper arm tingling, and upper thigh tingling that occurs when her blood pressure gets high.  PCP is regulating blood pressure meds, and patient is currently taking chlorthalidone and Toprol-XL.  She tells me she has been breaking the Toprol- mg because she feels like a whole tablet is too much.  Denies any headache, extremity pain, visual changes, dizziness, lightheadedness, or weakness.       History provided by:  Patient  Neurologic Problem  The patient's pertinent negatives include no syncope or weakness. Primary symptoms comment: Tingling of the upper arms and anterior upper thighs.. This is a new problem. The current episode started yesterday. The neurological problem developed suddenly. The problem has been waxing and waning since onset. There was left-sided, right-sided, upper extremity and lower extremity focality noted. Pertinent negatives include no chest pain, confusion, dizziness, fever, light-headedness, neck pain, palpitations, shortness of breath or vertigo. Past treatments include nothing.       Review of Systems   Constitutional: Negative for fever.   HENT: Negative for tinnitus.    Eyes: Negative for visual disturbance.   Respiratory: Negative for chest tightness and shortness of breath.    Cardiovascular: Negative for chest pain, palpitations and leg swelling.   Musculoskeletal: Negative for neck pain.   Skin: Negative for color change and rash.   Neurological: Negative for dizziness, vertigo, syncope, weakness and light-headedness.   Psychiatric/Behavioral: Negative for confusion.   All other systems reviewed and are negative.      Past Medical History:   Diagnosis Date   • Dental bridge present    • GERD (gastroesophageal reflux disease)    • Hyperlipidemia    • Hypertension    • Parathyroid adenoma    • Wears eyeglasses        Allergies   Allergen Reactions   • Reglan  [Metoclopramide] Anxiety       Past Surgical History:   Procedure Laterality Date   • COLONOSCOPY      2 years ago   • MOUTH SURGERY     • ME EXPLORE PARATHYROID GLANDS N/A 2/7/2018    Procedure: EXCISION PARATHYROID ADENOMA;  Surgeon: Rick COOPER MD;  Location: [facility];  Service: General   • TONSILLECTOMY         Family History   Family history unknown: Yes       Social History     Socioeconomic History   • Marital status:      Spouse name: Not on file   • Number of children: Not on file   • Years of education: Not on file   • Highest education level: Not on file   Tobacco Use   • Smoking status: Former Smoker   • Smokeless tobacco: Never Used   Substance and Sexual Activity   • Alcohol use: Yes     Comment: occasionally   • Drug use: No   • Sexual activity: Yes     Partners: Male     Comment:             Objective   Physical Exam  Vitals and nursing note reviewed.   Constitutional:       Appearance: Normal appearance.   HENT:      Head: Normocephalic and atraumatic.      Right Ear: Tympanic membrane and external ear normal.      Left Ear: Tympanic membrane and external ear normal.      Mouth/Throat:      Mouth: Mucous membranes are moist.   Eyes:      Extraocular Movements: Extraocular movements intact.      Conjunctiva/sclera: Conjunctivae normal.      Pupils: Pupils are equal, round, and reactive to light.   Neck:      Vascular: No carotid bruit.   Cardiovascular:      Rate and Rhythm: Normal rate and regular rhythm.      Pulses: Normal pulses.           Radial pulses are 2+ on the right side and 2+ on the left side.        Dorsalis pedis pulses are 2+ on the left side.      Comments: Brisk capillary refill bilateral upper extremities, and bilateral lower extremities.  Abdominal:      General: Abdomen is flat. Bowel sounds are normal. There is no distension.      Palpations: Abdomen is soft.      Tenderness: There is no abdominal tenderness.   Musculoskeletal:         General: Normal range of  motion.      Cervical back: Normal range of motion and neck supple. No tenderness.      Comments: Strong equal .    Skin:     General: Skin is warm and dry.      Capillary Refill: Capillary refill takes less than 2 seconds.   Neurological:      General: No focal deficit present.      Mental Status: She is alert and oriented to person, place, and time. Mental status is at baseline.      Sensory: No sensory deficit.      Coordination: Coordination normal.   Psychiatric:         Mood and Affect: Mood normal.         Behavior: Behavior normal.         Procedures           ED Course      Recent Results (from the past 24 hour(s))   Comprehensive Metabolic Panel    Collection Time: 07/25/21  3:48 PM    Specimen: Blood   Result Value Ref Range    Glucose 110 (H) 65 - 99 mg/dL    BUN 10 8 - 23 mg/dL    Creatinine 0.91 0.57 - 1.00 mg/dL    Sodium 138 136 - 145 mmol/L    Potassium 4.1 3.5 - 5.2 mmol/L    Chloride 100 98 - 107 mmol/L    CO2 26.0 22.0 - 29.0 mmol/L    Calcium 9.5 8.6 - 10.5 mg/dL    Total Protein 7.0 6.0 - 8.5 g/dL    Albumin 4.10 3.50 - 5.20 g/dL    ALT (SGPT) 12 1 - 33 U/L    AST (SGOT) 17 1 - 32 U/L    Alkaline Phosphatase 84 39 - 117 U/L    Total Bilirubin 0.4 0.0 - 1.2 mg/dL    eGFR  African Amer 73 >60 mL/min/1.73    Globulin 2.9 gm/dL    A/G Ratio 1.4 g/dL    BUN/Creatinine Ratio 11.0 7.0 - 25.0    Anion Gap 12.0 5.0 - 15.0 mmol/L   Troponin    Collection Time: 07/25/21  3:48 PM    Specimen: Blood   Result Value Ref Range    Troponin T <0.010 0.000 - 0.030 ng/mL   Magnesium    Collection Time: 07/25/21  3:48 PM    Specimen: Blood   Result Value Ref Range    Magnesium 1.9 1.6 - 2.4 mg/dL   Green Top (Gel)    Collection Time: 07/25/21  3:48 PM   Result Value Ref Range    Extra Tube Hold for add-ons.    Lavender Top    Collection Time: 07/25/21  3:48 PM   Result Value Ref Range    Extra Tube hold for add-on    Gold Top - SST    Collection Time: 07/25/21  3:48 PM   Result Value Ref Range    Extra Tube  Hold for add-ons.    CBC Auto Differential    Collection Time: 07/25/21  3:48 PM    Specimen: Blood   Result Value Ref Range    WBC 3.93 3.40 - 10.80 10*3/mm3    RBC 5.14 3.77 - 5.28 10*6/mm3    Hemoglobin 14.6 12.0 - 15.9 g/dL    Hematocrit 44.5 34.0 - 46.6 %    MCV 86.6 79.0 - 97.0 fL    MCH 28.4 26.6 - 33.0 pg    MCHC 32.8 31.5 - 35.7 g/dL    RDW 13.8 12.3 - 15.4 %    RDW-SD 43.4 37.0 - 54.0 fl    MPV 9.7 6.0 - 12.0 fL    Platelets 273 140 - 450 10*3/mm3    Neutrophil % 54.4 42.7 - 76.0 %    Lymphocyte % 33.6 19.6 - 45.3 %    Monocyte % 8.4 5.0 - 12.0 %    Eosinophil % 2.3 0.3 - 6.2 %    Basophil % 1.3 0.0 - 1.5 %    Immature Grans % 0.0 0.0 - 0.5 %    Neutrophils, Absolute 2.14 1.70 - 7.00 10*3/mm3    Lymphocytes, Absolute 1.32 0.70 - 3.10 10*3/mm3    Monocytes, Absolute 0.33 0.10 - 0.90 10*3/mm3    Eosinophils, Absolute 0.09 0.00 - 0.40 10*3/mm3    Basophils, Absolute 0.05 0.00 - 0.20 10*3/mm3    Immature Grans, Absolute 0.00 0.00 - 0.05 10*3/mm3    nRBC 0.0 0.0 - 0.2 /100 WBC   Urinalysis With Microscopic If Indicated (No Culture) - Urine, Clean Catch    Collection Time: 07/25/21  4:30 PM    Specimen: Urine, Clean Catch   Result Value Ref Range    Color, UA Yellow Yellow, Straw    Appearance, UA Clear Clear    pH, UA 7.5 5.0 - 8.0    Specific Gravity, UA 1.011 1.001 - 1.030    Glucose, UA Negative Negative    Ketones, UA Negative Negative    Bilirubin, UA Negative Negative    Blood, UA Negative Negative    Protein, UA Negative Negative    Leuk Esterase, UA Small (1+) (A) Negative    Nitrite, UA Negative Negative    Urobilinogen, UA 1.0 E.U./dL 0.2 - 1.0 E.U./dL   Urinalysis, Microscopic Only - Urine, Clean Catch    Collection Time: 07/25/21  4:30 PM    Specimen: Urine, Clean Catch   Result Value Ref Range    RBC, UA 0-2 None Seen, 0-2 /HPF    WBC, UA 0-2 None Seen, 0-2 /HPF    Bacteria, UA None Seen None Seen, Trace /HPF    Squamous Epithelial Cells, UA 0-2 None Seen, 0-2 /HPF    Hyaline Casts, UA None Seen 0  "- 6 /LPF    Methodology Automated Microscopy      Note: In addition to lab results from this visit, the labs listed above may include labs taken at another facility or during a different encounter within the last 24 hours. Please correlate lab times with ED admission and discharge times for further clarification of the services performed during this visit.    CT Head Without Contrast   Preliminary Result   No acute intracranial abnormality.       DICTATED:   07/25/2021   EDITED/ls :   07/25/2021              XR Chest 1 View   Preliminary Result   No acute cardiopulmonary process                Vitals:    07/25/21 1439 07/25/21 1454   BP: 158/100    Pulse: 91    Resp: 16    Temp: 97.1 °F (36.2 °C)    SpO2: 100%    Weight:  70.3 kg (155 lb)   Height:  165.1 cm (65\")     Medications   sodium chloride 0.9 % flush 10 mL (has no administration in time range)     ECG/EMG Results (last 24 hours)     Procedure Component Value Units Date/Time    ECG 12 Lead [[id number]] Collected: 07/25/21 1456     Updated: 07/25/21 1455        ECG 12 Lead         Reexamination: Patient sitting upright in bed in no acute distress.  Vital signs remained stable.  Remains symptom-free stating that there is no tingling of the upper arms or legs at this time.    Discussed lab findings and radiology findings with patient.  Recommend to take a whole Toprol tablet as directed and not to break in half due to the extended release nature of the drug.  Advised to follow-up with neurology next week, and cardiology for blood pressure management.  Instructed patient to return to the ER with worsening of symptoms or development of new symptoms.  Patient verbalized understanding of all discussed                                     MDM    Final diagnoses:   Hypertension, unspecified type   Paresthesia of upper and lower extremities of both sides       ED Disposition  ED Disposition     ED Disposition Condition Comment    Discharge Stable           Kaykay, " MD Lynette  [address]  [facility] [address] [phone]    In 3 days      [facility] Emergency Department  [address] [address] [phone]    As needed, If symptoms worsen         Medication List      No changes were made to your prescriptions during this visit.          Danyelle Kincaid, APRN  07/25/21 2122

## 2021-07-26 LAB
QT INTERVAL: 360 MS
QTC INTERVAL: 423 MS

## 2021-12-06 ENCOUNTER — TELEPHONE (OUTPATIENT)
Dept: GASTROENTEROLOGY | Facility: CLINIC | Age: 76
End: 2021-12-06

## 2021-12-06 NOTE — TELEPHONE ENCOUNTER
I spoke with Ms Rutherford. Patient stated that she thought she got that drawn but she doesn't need the test done now.

## 2023-09-18 ENCOUNTER — TELEPHONE (OUTPATIENT)
Dept: GASTROENTEROLOGY | Facility: CLINIC | Age: 78
End: 2023-09-18
Payer: MEDICARE

## 2023-09-18 NOTE — TELEPHONE ENCOUNTER
Caller: Amy Rutherford    Relationship to patient: Self    Best call back number:  [phone]      If rescheduling, when is the original appointment: 11/16/2023     Additional notes:CALLING TO RESCHEDULE PROCEDURE

## 2023-09-28 ENCOUNTER — OFFICE VISIT (OUTPATIENT)
Dept: GASTROENTEROLOGY | Facility: CLINIC | Age: 78
End: 2023-09-28
Payer: MEDICARE

## 2023-09-28 VITALS
SYSTOLIC BLOOD PRESSURE: 142 MMHG | HEIGHT: 64 IN | WEIGHT: 152 LBS | BODY MASS INDEX: 25.95 KG/M2 | HEART RATE: 87 BPM | OXYGEN SATURATION: 96 % | TEMPERATURE: 96.9 F | DIASTOLIC BLOOD PRESSURE: 100 MMHG

## 2023-09-28 DIAGNOSIS — K21.9 GASTROESOPHAGEAL REFLUX DISEASE, UNSPECIFIED WHETHER ESOPHAGITIS PRESENT: ICD-10-CM

## 2023-09-28 DIAGNOSIS — R12 HEARTBURN: ICD-10-CM

## 2023-09-28 DIAGNOSIS — R11.0 NAUSEA: Primary | ICD-10-CM

## 2023-09-28 PROBLEM — E78.5 DYSLIPIDEMIA: Status: ACTIVE | Noted: 2021-06-17

## 2023-09-28 PROBLEM — J30.2 SEASONAL ALLERGIES: Status: ACTIVE | Noted: 2021-06-17

## 2023-09-28 PROBLEM — R53.83 OTHER FATIGUE: Status: ACTIVE | Noted: 2021-09-23

## 2023-09-28 PROBLEM — R63.0 LOSS OF APPETITE: Status: ACTIVE | Noted: 2021-06-17

## 2023-09-28 PROBLEM — R10.13 EPIGASTRIC PAIN: Status: ACTIVE | Noted: 2021-06-17

## 2023-09-28 PROBLEM — E21.5 DISORDER OF PARATHYROID GLAND: Chronic | Status: ACTIVE | Noted: 2019-12-06

## 2023-09-28 PROBLEM — E21.0 PRIMARY HYPERPARATHYROIDISM: Chronic | Status: ACTIVE | Noted: 2019-10-19

## 2023-09-28 PROBLEM — K57.30 DIVERTICULOSIS OF COLON: Status: ACTIVE | Noted: 2021-06-17

## 2023-09-28 PROBLEM — E04.2 MULTINODULAR GOITER: Status: ACTIVE | Noted: 2019-10-19

## 2023-09-28 PROBLEM — M10.9 GOUT: Status: ACTIVE | Noted: 2021-06-17

## 2023-09-28 PROBLEM — R63.4 WEIGHT LOSS, UNINTENTIONAL: Status: ACTIVE | Noted: 2019-10-19

## 2023-09-28 PROBLEM — I20.9 ANGINAL PAIN: Chronic | Status: ACTIVE | Noted: 2020-02-18

## 2023-09-28 PROBLEM — R79.89 LOW VITAMIN D LEVEL: Status: ACTIVE | Noted: 2022-02-15

## 2023-09-28 PROBLEM — E53.8 DEFICIENCY OF OTHER SPECIFIED B GROUP VITAMINS: Status: ACTIVE | Noted: 2022-02-15

## 2023-09-28 PROBLEM — R00.0 TACHYCARDIA: Status: ACTIVE | Noted: 2020-06-09

## 2023-09-28 PROBLEM — E89.2 HISTORY OF PARATHYROIDECTOMY: Chronic | Status: ACTIVE | Noted: 2019-10-19

## 2023-09-28 PROBLEM — R20.0 ANESTHESIA OF SKIN: Status: ACTIVE | Noted: 2021-06-17

## 2023-09-28 PROBLEM — Z79.01 LONG TERM (CURRENT) USE OF ANTICOAGULANTS: Status: ACTIVE | Noted: 2020-06-09

## 2023-09-28 PROBLEM — E78.49 OTHER HYPERLIPIDEMIA: Status: ACTIVE | Noted: 2022-07-19

## 2023-09-28 PROBLEM — I26.99 PULMONARY EMBOLISM: Chronic | Status: ACTIVE | Noted: 2019-10-14

## 2023-09-28 PROBLEM — Z86.718 HISTORY OF DVT (DEEP VEIN THROMBOSIS): Status: ACTIVE | Noted: 2019-10-19

## 2023-09-28 PROBLEM — Z98.890 HISTORY OF PARATHYROIDECTOMY: Chronic | Status: ACTIVE | Noted: 2019-10-19

## 2023-09-28 PROBLEM — E83.52 HYPERCALCEMIA: Status: ACTIVE | Noted: 2019-10-14

## 2023-09-28 PROBLEM — M79.10 MUSCLE PAIN: Status: ACTIVE | Noted: 2021-07-28

## 2023-09-28 PROBLEM — I10 ESSENTIAL HYPERTENSION: Status: ACTIVE | Noted: 2022-07-19

## 2023-09-28 PROBLEM — I25.10 CORONARY ARTERY DISEASE: Status: ACTIVE | Noted: 2020-02-12

## 2023-09-28 PROBLEM — Z90.89 HISTORY OF PARATHYROIDECTOMY: Chronic | Status: ACTIVE | Noted: 2019-10-19

## 2023-09-28 PROBLEM — M54.30 SCIATIC LEG PAIN: Status: ACTIVE | Noted: 2020-11-24

## 2023-09-28 PROCEDURE — 3080F DIAST BP >= 90 MM HG: CPT | Performed by: INTERNAL MEDICINE

## 2023-09-28 PROCEDURE — 99213 OFFICE O/P EST LOW 20 MIN: CPT | Performed by: INTERNAL MEDICINE

## 2023-09-28 PROCEDURE — 3077F SYST BP >= 140 MM HG: CPT | Performed by: INTERNAL MEDICINE

## 2023-09-28 PROCEDURE — 1160F RVW MEDS BY RX/DR IN RCRD: CPT | Performed by: INTERNAL MEDICINE

## 2023-09-28 PROCEDURE — 1159F MED LIST DOCD IN RCRD: CPT | Performed by: INTERNAL MEDICINE

## 2023-09-28 RX ORDER — FAMOTIDINE 40 MG/1
40 TABLET, FILM COATED ORAL AS NEEDED
COMMUNITY
Start: 2023-08-09

## 2023-09-28 RX ORDER — HYDROCHLOROTHIAZIDE 25 MG/1
25 TABLET ORAL DAILY
Qty: 30 TABLET | Refills: 11 | COMMUNITY
Start: 2023-09-26 | End: 2024-09-25

## 2023-09-28 RX ORDER — PANTOPRAZOLE SODIUM 40 MG/1
40 TABLET, DELAYED RELEASE ORAL DAILY
COMMUNITY
Start: 2023-09-26

## 2023-09-28 NOTE — PROGRESS NOTES
Patient Name: Amy Rutherford  YOB: 1945   Medical Record number: [id number]     PCP: Ange Gomez PA    Chief Complaint   Patient presents with    Nausea    Heartburn       History of Present Illness:   HPI  Mrs. Rutherford returns to the office for a follow-up visit.  The patient has been doing well for over a year.  However, over a month ago she began experiencing some upper abdominal burning, bloating and nausea.  The patient admits that anxiety and high fat content foods will make the symptoms worse.  She had an EGD performed by Dr. Almodovar at the [facility] and biopsies were taken during the procedure. The results of the biopsies were unknown to the patient and not available at the time of this office visit.  Mrs. Rutherford states she is feeling better.  The patient had some adverse effect with Pepcid.  The patient is going to take Protonix.  Past Medical History:   Diagnosis Date    Dental bridge present     GERD (gastroesophageal reflux disease)     Hyperlipidemia     Hypertension     Parathyroid adenoma     Wears eyeglasses        Past Surgical History:   Procedure Laterality Date    COLONOSCOPY      2 years ago    MOUTH SURGERY      CT PARATHYROIDECTOMY/EXPLORATION PARATHYROIDS N/A 2/7/2018    Procedure: EXCISION PARATHYROID ADENOMA;  Surgeon: Rick COOPER MD;  Location: [facility];  Service: General    TONSILLECTOMY     S/P Cholecystectomy      Current Outpatient Medications:     amLODIPine (NORVASC) 10 MG tablet, Take 1 tablet by mouth Daily., Disp: , Rfl:     cyanocobalamin 1000 MCG/ML injection, INJECT 1 MILLILITER ONCE MONTHLY, Disp: , Rfl:     famotidine (PEPCID) 40 MG tablet, Take 1 tablet by mouth As Needed., Disp: , Rfl:     hydroCHLOROthiazide (HYDRODIURIL) 25 MG tablet, Take 1 tablet by mouth Daily., Disp: 30 tablet, Rfl: 11    losartan (COZAAR) 25 MG tablet, Take 1 tablet by mouth Daily., Disp: , Rfl:     metoprolol succinate XL (TOPROL-XL) 50 MG 24 hr tablet, Take 4  tablets by mouth Daily., Disp: , Rfl:     pantoprazole (PROTONIX) 40 MG EC tablet, Take 1 tablet by mouth Daily., Disp: , Rfl:     aspirin 81 MG EC tablet, Take 1 tablet by mouth Daily., Disp: , Rfl:     benzonatate (TESSALON) 100 MG capsule, TAKE 1 CAPSULE BY MOUTH THREE TIMES DAILY FOR 7 DAYS, Disp: , Rfl:     chlorthalidone (HYGROTON) 25 MG tablet, Take 1 tablet by mouth Every Morning., Disp: , Rfl:     fluticasone (FLONASE) 50 MCG/ACT nasal spray, SHAKE LIQUID AND USE 1 SPRAY IN EACH NOSTRIL EVERY DAY, Disp: , Rfl:     naproxen sodium (ALEVE) 220 MG tablet, Take 1 tablet by mouth 2 (Two) Times a Day As Needed for Mild Pain., Disp: , Rfl:     oxyCODONE-acetaminophen (PERCOCET) 5-325 MG per tablet, Take 1-2 tablets by mouth Every 6 (Six) Hours As Needed for pain., Disp: 20 tablet, Rfl: 0    pravastatin (PRAVACHOL) 40 MG tablet, Take 1 tablet by mouth Every Night., Disp: , Rfl:     promethazine (PHENERGAN) 12.5 MG tablet, TAKE 1 TABLET BY MOUTH TWICE DAILY AS NEEDED FOR NAUSEA, Disp: , Rfl:     promethazine (PHENERGAN) 25 MG tablet, Take 1 tablet by mouth Every 8 (Eight) Hours As Needed. for nausea, Disp: , Rfl:     rosuvastatin (CRESTOR) 20 MG tablet, Take 1 tablet by mouth Every Night., Disp: , Rfl:     vitamin D (ERGOCALCIFEROL) 1.25 MG (54777 UT) capsule capsule, Take 1 capsule by mouth 1 (One) Time Per Week., Disp: , Rfl:     Allergies   Allergen Reactions    Reglan [Metoclopramide] Anxiety       Family History   Family history unknown: Yes       Social History     Socioeconomic History    Marital status:    Tobacco Use    Smoking status: Former    Smokeless tobacco: Never   Vaping Use    Vaping Use: Never used   Substance and Sexual Activity    Alcohol use: Not Currently     Comment: occasionally    Drug use: No    Sexual activity: Yes     Partners: Male     Comment:         Review of Systems   Constitutional:  Negative for activity change, appetite change, fatigue, fever and unexpected weight  change.   HENT:  Negative for dental problem, hearing loss, mouth sores, postnasal drip, sneezing, trouble swallowing and voice change.    Eyes:  Negative for pain, redness, itching and visual disturbance.   Respiratory:  Negative for cough, choking, chest tightness, shortness of breath and wheezing.    Cardiovascular:  Negative for chest pain, palpitations and leg swelling.   Gastrointestinal:  Positive for nausea. Negative for abdominal distention, abdominal pain, anal bleeding, blood in stool, constipation, diarrhea, rectal pain and vomiting.        Heartburn   Endocrine: Negative for cold intolerance, heat intolerance, polydipsia, polyphagia and polyuria.   Genitourinary: Negative.  Negative for dysuria, enuresis, flank pain, hematuria and urgency.   Musculoskeletal:  Negative for arthralgias, back pain, gait problem, joint swelling and myalgias.   Skin:  Negative for color change, pallor and rash.   Allergic/Immunologic: Negative for environmental allergies, food allergies and immunocompromised state.   Neurological:  Negative for dizziness, tremors, seizures, facial asymmetry, speech difficulty, numbness and headaches.   Hematological:  Negative for adenopathy.   Psychiatric/Behavioral:  Negative for behavioral problems, confusion, dysphoric mood, hallucinations and self-injury.      Vitals:    09/28/23 1446   BP: 142/100   Pulse: 87   Temp: 96.9 °F (36.1 °C)   SpO2: 96%       Physical Exam  Vitals reviewed.   Constitutional:       General: She is not in acute distress.     Appearance: Normal appearance.   HENT:      Head: Normocephalic and atraumatic.      Nose: Nose normal.      Mouth/Throat:      Mouth: Mucous membranes are moist.      Pharynx: Oropharynx is clear.   Eyes:      General: No scleral icterus.     Extraocular Movements: Extraocular movements intact.   Cardiovascular:      Rate and Rhythm: Normal rate and regular rhythm.      Heart sounds: No murmur heard.  Pulmonary:      Breath sounds: Normal  breath sounds. No wheezing or rales.   Abdominal:      General: Bowel sounds are normal.      Palpations: Abdomen is soft.      Tenderness: There is no abdominal tenderness. There is no guarding.   Musculoskeletal:         General: No swelling. Normal range of motion.      Cervical back: Normal range of motion and neck supple.   Skin:     General: Skin is dry.      Coloration: Skin is not jaundiced.   Neurological:      Mental Status: She is alert and oriented to person, place, and time.   Psychiatric:         Mood and Affect: Mood normal.         Thought Content: Thought content normal.         Judgment: Judgment normal.       Diagnoses and all orders for this visit:    1. Nausea (Primary)    2. Heartburn    3. Gastroesophageal reflux disease, unspecified whether esophagitis present    The patient states the symptoms have improved over the last week.  She does not have any alarm features with regard to anemia, unexplained weight loss or decreased appetite.  She had a normal gastric emptying study over a year ago.      Plan: Will ask for a copy of the pathology report from the EGD performed at the [facility].           Discussed the use of proton pump medication on a daily basis for GERD.           The patient will return to the office as needed.

## 2025-05-16 ENCOUNTER — TELEPHONE (OUTPATIENT)
Dept: GASTROENTEROLOGY | Facility: CLINIC | Age: 80
End: 2025-05-16

## 2025-05-16 ENCOUNTER — OFFICE VISIT (OUTPATIENT)
Dept: GASTROENTEROLOGY | Facility: CLINIC | Age: 80
End: 2025-05-16
Payer: MEDICARE

## 2025-05-16 VITALS — HEIGHT: 64 IN | BODY MASS INDEX: 23.87 KG/M2 | WEIGHT: 139.8 LBS

## 2025-05-16 DIAGNOSIS — I77.4 CELIAC ARTERY STENOSIS: ICD-10-CM

## 2025-05-16 DIAGNOSIS — K58.0 IRRITABLE BOWEL SYNDROME WITH DIARRHEA: ICD-10-CM

## 2025-05-16 DIAGNOSIS — R14.0 BLOATING: Primary | ICD-10-CM

## 2025-05-16 NOTE — PROGRESS NOTES
Patient Name: Amy Rutherford  YOB: 1945   Medical Record number: [id number]     PCP: Ange Gomez PA    Chief Complaint   Patient presents with    bowel issues       History of Present Illness:   HPI  Mrs. Rutherford presents to the office for evaluation.  The patient states that since her gallbladder surgery she has experienced more issues with loose stool after meals.  However, the patient says that the problem seems to be more pronounced now.  She generally does not go out to eat with friends due to the sense of urgency to use the restroom after eating.  Mrs. Rutherford says that certain foods may cause more of an issue with her bowel habits.  The patient denies any gross blood in the stool.  There is no history of unexplained weight loss.  She denies any nocturnal diarrhea.  Mrs. Rutherford has episodes of bloating.  There is no history of difficult or painful swallowing.  She actually denies any abdominal pain.  There is no history of nausea at this time.  The patient reports an episode of food poisoning back in March of this year.  Mrs. Rutherford had a colonoscopy about 8 or 9 years ago reportedly at [facility].  She did not report any polyps being found at that time.  Past Medical History:   Diagnosis Date    Dental bridge present     GERD (gastroesophageal reflux disease)     Hyperlipidemia     Hypertension     Parathyroid adenoma     Wears eyeglasses        Past Surgical History:   Procedure Laterality Date    COLONOSCOPY      2 years ago    MOUTH SURGERY      MD PARATHYROIDECTOMY/EXPLORATION PARATHYROIDS N/A 2/7/2018    Procedure: EXCISION PARATHYROID ADENOMA;  Surgeon: Rick COOPER MD;  Location: [facility];  Service: General    TONSILLECTOMY           Current Outpatient Medications:     aspirin 81 MG EC tablet, Take 1 tablet by mouth Daily., Disp: , Rfl:     cyanocobalamin 1000 MCG/ML injection, INJECT 1 MILLILITER ONCE MONTHLY, Disp: , Rfl:     famotidine (PEPCID) 40 MG  tablet, Take 1 tablet by mouth As Needed., Disp: , Rfl:     hydroCHLOROthiazide (HYDRODIURIL) 25 MG tablet, Take 1 tablet by mouth Daily., Disp: 30 tablet, Rfl: 11    losartan (COZAAR) 25 MG tablet, Take 1 tablet by mouth Daily., Disp: , Rfl:     metoprolol succinate XL (TOPROL-XL) 50 MG 24 hr tablet, Take 4 tablets by mouth Daily., Disp: , Rfl:     pravastatin (PRAVACHOL) 40 MG tablet, Take 1 tablet by mouth Every Night., Disp: , Rfl:     promethazine (PHENERGAN) 12.5 MG tablet, TAKE 1 TABLET BY MOUTH TWICE DAILY AS NEEDED FOR NAUSEA, Disp: , Rfl:     vitamin D (ERGOCALCIFEROL) 1.25 MG (53618 UT) capsule capsule, Take 1 capsule by mouth 1 (One) Time Per Week., Disp: , Rfl:     amLODIPine (NORVASC) 10 MG tablet, Take 1 tablet by mouth Daily. (Patient not taking: Reported on 5/16/2025), Disp: , Rfl:     benzonatate (TESSALON) 100 MG capsule, TAKE 1 CAPSULE BY MOUTH THREE TIMES DAILY FOR 7 DAYS (Patient not taking: Reported on 5/16/2025), Disp: , Rfl:     chlorthalidone (HYGROTON) 25 MG tablet, Take 1 tablet by mouth Every Morning. (Patient not taking: Reported on 5/16/2025), Disp: , Rfl:     fluticasone (FLONASE) 50 MCG/ACT nasal spray, SHAKE LIQUID AND USE 1 SPRAY IN EACH NOSTRIL EVERY DAY (Patient not taking: Reported on 5/16/2025), Disp: , Rfl:     naproxen sodium (ALEVE) 220 MG tablet, Take 1 tablet by mouth 2 (Two) Times a Day As Needed for Mild Pain. (Patient not taking: Reported on 5/16/2025), Disp: , Rfl:     oxyCODONE-acetaminophen (PERCOCET) 5-325 MG per tablet, Take 1-2 tablets by mouth Every 6 (Six) Hours As Needed for pain. (Patient not taking: Reported on 5/16/2025), Disp: 20 tablet, Rfl: 0    pantoprazole (PROTONIX) 40 MG EC tablet, Take 1 tablet by mouth Daily. (Patient not taking: Reported on 5/16/2025), Disp: , Rfl:     promethazine (PHENERGAN) 25 MG tablet, Take 1 tablet by mouth Every 8 (Eight) Hours As Needed. for nausea (Patient not taking: Reported on 5/16/2025), Disp: , Rfl:     riFAXIMin  (Xifaxan) 550 MG tablet, Take 1 tablet by mouth Every 8 (Eight) Hours., Disp: 42 tablet, Rfl: 0    rosuvastatin (CRESTOR) 20 MG tablet, Take 1 tablet by mouth Every Night. (Patient not taking: Reported on 5/16/2025), Disp: , Rfl:     Allergies   Allergen Reactions    Lisinopril Cough and Other (See Comments)     Pt reports cough    Nsaids Other (See Comments)     Abdominal burning    Reglan [Metoclopramide] Anxiety       Family History   Family history unknown: Yes       Social History     Socioeconomic History    Marital status:    Tobacco Use    Smoking status: Former    Smokeless tobacco: Never   Vaping Use    Vaping status: Never Used   Substance and Sexual Activity    Alcohol use: Not Currently     Comment: occasionally    Drug use: No    Sexual activity: Yes     Partners: Male     Comment:         Review of Systems   Constitutional:  Negative for activity change, appetite change, fatigue, fever and unexpected weight change.   HENT:  Negative for dental problem, hearing loss, mouth sores, postnasal drip, sneezing, trouble swallowing and voice change.    Eyes:  Negative for pain, redness, itching and visual disturbance.   Respiratory:  Negative for cough, choking, chest tightness, shortness of breath and wheezing.    Cardiovascular:  Negative for chest pain, palpitations and leg swelling.   Gastrointestinal:  Positive for abdominal distention (bloating) and diarrhea. Negative for abdominal pain, anal bleeding, blood in stool, constipation, nausea, rectal pain and vomiting.   Endocrine: Negative for cold intolerance, heat intolerance, polydipsia, polyphagia and polyuria.   Genitourinary: Negative.  Negative for dysuria, enuresis, flank pain, hematuria and urgency.   Musculoskeletal:  Negative for arthralgias, back pain, gait problem, joint swelling and myalgias.   Skin:  Negative for color change, pallor and rash.   Allergic/Immunologic: Negative for environmental allergies, food allergies and  immunocompromised state.   Neurological:  Negative for dizziness, tremors, seizures, facial asymmetry, speech difficulty, numbness and headaches.   Hematological:  Negative for adenopathy.   Psychiatric/Behavioral:  Negative for behavioral problems, confusion, dysphoric mood, hallucinations and self-injury.        There were no vitals filed for this visit.    Physical Exam  Vitals reviewed.   Constitutional:       General: She is not in acute distress.     Appearance: Normal appearance.   HENT:      Head: Normocephalic and atraumatic.      Nose: Nose normal.      Mouth/Throat:      Mouth: Mucous membranes are moist.      Pharynx: Oropharynx is clear. No posterior oropharyngeal erythema.   Eyes:      General: No scleral icterus.     Extraocular Movements: Extraocular movements intact.   Cardiovascular:      Rate and Rhythm: Normal rate and regular rhythm.      Heart sounds: No murmur heard.  Pulmonary:      Breath sounds: Normal breath sounds. No wheezing or rales.   Abdominal:      General: Bowel sounds are normal.      Palpations: Abdomen is soft.      Tenderness: There is no abdominal tenderness. There is no guarding.   Musculoskeletal:         General: No swelling. Normal range of motion.      Cervical back: Normal range of motion and neck supple.   Skin:     General: Skin is dry.      Coloration: Skin is not jaundiced.   Neurological:      Mental Status: She is alert and oriented to person, place, and time.   Psychiatric:         Mood and Affect: Mood normal.         Thought Content: Thought content normal.         Judgment: Judgment normal.         Diagnoses and all orders for this visit:    1. Bloating (Primary)  -     Ambulatory Referral For Screening Colonoscopy  -     Calprotectin, Fecal - Stool, Per Rectum; Future    2. Irritable bowel syndrome with diarrhea  -     Ambulatory Referral For Screening Colonoscopy  -     riFAXIMin (Xifaxan) 550 MG tablet; Take 1 tablet by mouth Every 8 (Eight) Hours.   Dispense: 42 tablet; Refill: 0  -     Calprotectin, Fecal - Stool, Per Rectum; Future    3. Celiac artery stenosis    The patient has [place] criteria for irritable bowel syndrome with diarrhea predominance.  There also may be an element of type III bile acid diarrhea.  There are no anatoliy alarm features at this time with regard to anemia.      Plan: Will check fecal calprotectin.           Will give the patient a handout on the low FODMAP diet.           Will proceed with colonoscopy and biopsies to rule out microscopic colitis.           Will give 14-day course of Xifaxan.           Consider duplex scan of celiac artery trunk to further evaluate celiac artery from CT scan.

## 2025-06-17 ENCOUNTER — TELEPHONE (OUTPATIENT)
Dept: GASTROENTEROLOGY | Facility: CLINIC | Age: 80
End: 2025-06-17
Payer: MEDICARE

## 2025-06-17 NOTE — TELEPHONE ENCOUNTER
Pt called in to get exact instructions for bowl prep she was concerned because she got suflave but the powder is already mixed into the bottle. I told pt if the powder is already mixed in that's fine but make sure she follows the instructions on when to take the prep and how long to stop drinking before colonoscopy in the morning.

## 2025-06-18 ENCOUNTER — OUTSIDE FACILITY SERVICE (OUTPATIENT)
Dept: GASTROENTEROLOGY | Facility: CLINIC | Age: 80
End: 2025-06-18
Payer: MEDICARE

## 2025-06-18 ENCOUNTER — LAB REQUISITION (OUTPATIENT)
Dept: LAB | Facility: HOSPITAL | Age: 80
End: 2025-06-18
Payer: MEDICARE

## 2025-06-18 DIAGNOSIS — R19.7 DIARRHEA, UNSPECIFIED: ICD-10-CM

## 2025-06-18 DIAGNOSIS — K57.30 DIVERTICULOSIS OF LARGE INTESTINE WITHOUT PERFORATION OR ABSCESS WITHOUT BLEEDING: ICD-10-CM

## 2025-06-18 DIAGNOSIS — K64.8 OTHER HEMORRHOIDS: ICD-10-CM

## 2025-06-18 PROCEDURE — 45380 COLONOSCOPY AND BIOPSY: CPT | Performed by: INTERNAL MEDICINE

## 2025-06-18 PROCEDURE — 88305 TISSUE EXAM BY PATHOLOGIST: CPT | Performed by: INTERNAL MEDICINE

## 2025-06-20 ENCOUNTER — TELEPHONE (OUTPATIENT)
Dept: GASTROENTEROLOGY | Facility: CLINIC | Age: 80
End: 2025-06-20
Payer: MEDICARE

## 2025-06-20 LAB
CYTO UR: NORMAL
LAB AP CASE REPORT: NORMAL
LAB AP CLINICAL INFORMATION: NORMAL
PATH REPORT.FINAL DX SPEC: NORMAL
PATH REPORT.GROSS SPEC: NORMAL

## 2025-06-20 NOTE — TELEPHONE ENCOUNTER
I spoke with Mrs. Rutherford.  The biopsies did not demonstrate any change of microscopic colitis.  The history is consistent with IBS.  She did have diverticulosis.  Mrs. Rutherford states that when she went to get the Xifaxa medicine the cost was $800.

## (undated) DEVICE — CANNULA,OXY,ADULT,SUPERSOFT,W/7'TUB,UC: Brand: MEDLINE

## (undated) DEVICE — SUT SILK 3/0 TIES 18IN A184H

## (undated) DEVICE — STERILE LATEX POWDER FREE SURGICAL GLOVES WITH HYDROGEL COATING: Brand: PROTEXIS

## (undated) DEVICE — SUT SILK 2/0 TIES 18IN A185H

## (undated) DEVICE — DISPOSABLE BIPOLAR FORCEPS 5 1/2" (14CM) SEMKIN, 0.7MM TIP AND 12 FT. (3.6M) CABLE: Brand: KIRWAN

## (undated) DEVICE — DRSNG SURESITE WNDW 4X4.5

## (undated) DEVICE — SOL LR 1000ML

## (undated) DEVICE — APPL CHLORAPREP W/TINT 26ML BLU

## (undated) DEVICE — MAGNETIC DRAPE: Brand: DEVON

## (undated) DEVICE — ELECTRD BLD 1P STD SS 3/32X2.44IN

## (undated) DEVICE — IRRIGATOR BULB ASEPTO 60CC STRL

## (undated) DEVICE — SUT SILK 4/0 TIES 18IN A183H

## (undated) DEVICE — PK MINOR SPLT 10

## (undated) DEVICE — CAUTERY TIP POLISHER: Brand: DEVON

## (undated) DEVICE — ANTIBACTERIAL UNDYED BRAIDED (POLYGLACTIN 910), SYNTHETIC ABSORBABLE SUTURE: Brand: COATED VICRYL

## (undated) DEVICE — DISH PETRI 3.5IN MD STRL LF

## (undated) DEVICE — GOWN,REINF,POLY,ECL,PP SLV,XL: Brand: MEDLINE